# Patient Record
Sex: MALE | Race: WHITE | NOT HISPANIC OR LATINO | Employment: FULL TIME | URBAN - NONMETROPOLITAN AREA
[De-identification: names, ages, dates, MRNs, and addresses within clinical notes are randomized per-mention and may not be internally consistent; named-entity substitution may affect disease eponyms.]

---

## 2017-09-18 ENCOUNTER — TRANSCRIBE ORDERS (OUTPATIENT)
Dept: ADMINISTRATIVE | Facility: HOSPITAL | Age: 37
End: 2017-09-18

## 2017-09-18 ENCOUNTER — LAB (OUTPATIENT)
Dept: LAB | Facility: HOSPITAL | Age: 37
End: 2017-09-18

## 2017-09-18 DIAGNOSIS — R73.09 OTHER ABNORMAL GLUCOSE: ICD-10-CM

## 2017-09-18 DIAGNOSIS — K21.9 GASTROESOPHAGEAL REFLUX DISEASE WITHOUT ESOPHAGITIS: ICD-10-CM

## 2017-09-18 DIAGNOSIS — Z71.6 TOBACCO ABUSE COUNSELING: ICD-10-CM

## 2017-09-18 DIAGNOSIS — E66.8 MODERATE OBESITY: Primary | ICD-10-CM

## 2017-09-18 DIAGNOSIS — E66.8 MODERATE OBESITY: ICD-10-CM

## 2017-09-18 DIAGNOSIS — R53.82 CHRONIC FATIGUE: ICD-10-CM

## 2017-09-18 LAB
ALBUMIN SERPL-MCNC: 4.2 G/DL (ref 3.5–5)
ALBUMIN/GLOB SERPL: 1.5 G/DL (ref 1.5–2.5)
ALP SERPL-CCNC: 74 U/L (ref 40–129)
ALT SERPL W P-5'-P-CCNC: 33 U/L (ref 10–44)
ANION GAP SERPL CALCULATED.3IONS-SCNC: 4.6 MMOL/L (ref 3.6–11.2)
AST SERPL-CCNC: 15 U/L (ref 10–34)
BILIRUB SERPL-MCNC: 0.4 MG/DL (ref 0.2–1.8)
BUN BLD-MCNC: 9 MG/DL (ref 7–21)
BUN/CREAT SERPL: 12 (ref 7–25)
CALCIUM SPEC-SCNC: 9.6 MG/DL (ref 7.7–10)
CHLORIDE SERPL-SCNC: 108 MMOL/L (ref 99–112)
CHOLEST SERPL-MCNC: 160 MG/DL (ref 0–200)
CO2 SERPL-SCNC: 28.4 MMOL/L (ref 24.3–31.9)
CREAT BLD-MCNC: 0.75 MG/DL (ref 0.43–1.29)
GFR SERPL CREATININE-BSD FRML MDRD: 117 ML/MIN/1.73
GLOBULIN UR ELPH-MCNC: 2.8 GM/DL
GLUCOSE BLD-MCNC: 109 MG/DL (ref 70–110)
HBA1C MFR BLD: 6.3 % (ref 4.5–5.7)
HDLC SERPL-MCNC: 35 MG/DL (ref 60–100)
LDLC SERPL CALC-MCNC: 95 MG/DL (ref 0–100)
LDLC/HDLC SERPL: 2.7 {RATIO}
OSMOLALITY SERPL CALC.SUM OF ELEC: 280.5 MOSM/KG (ref 273–305)
POTASSIUM BLD-SCNC: 3.9 MMOL/L (ref 3.5–5.3)
PROT SERPL-MCNC: 7 G/DL (ref 6–8)
SODIUM BLD-SCNC: 141 MMOL/L (ref 135–153)
TRIGL SERPL-MCNC: 152 MG/DL (ref 0–150)
VLDLC SERPL-MCNC: 30.4 MG/DL

## 2017-09-18 PROCEDURE — 83036 HEMOGLOBIN GLYCOSYLATED A1C: CPT | Performed by: CLINIC/CENTER

## 2017-09-18 PROCEDURE — 36415 COLL VENOUS BLD VENIPUNCTURE: CPT

## 2017-09-18 PROCEDURE — 80061 LIPID PANEL: CPT | Performed by: CLINIC/CENTER

## 2017-09-18 PROCEDURE — 80053 COMPREHEN METABOLIC PANEL: CPT | Performed by: CLINIC/CENTER

## 2018-03-19 ENCOUNTER — APPOINTMENT (OUTPATIENT)
Dept: MRI IMAGING | Facility: HOSPITAL | Age: 38
End: 2018-03-19

## 2018-03-19 ENCOUNTER — APPOINTMENT (OUTPATIENT)
Dept: CARDIOLOGY | Facility: HOSPITAL | Age: 38
End: 2018-03-19

## 2018-03-19 ENCOUNTER — APPOINTMENT (OUTPATIENT)
Dept: CT IMAGING | Facility: HOSPITAL | Age: 38
End: 2018-03-19

## 2018-03-19 ENCOUNTER — APPOINTMENT (OUTPATIENT)
Dept: GENERAL RADIOLOGY | Facility: HOSPITAL | Age: 38
End: 2018-03-19

## 2018-03-19 ENCOUNTER — HOSPITAL ENCOUNTER (OUTPATIENT)
Facility: HOSPITAL | Age: 38
Setting detail: OBSERVATION
Discharge: HOME | End: 2018-03-20
Attending: INTERNAL MEDICINE | Admitting: INTERNAL MEDICINE

## 2018-03-19 ENCOUNTER — HOSPITAL ENCOUNTER (EMERGENCY)
Facility: HOSPITAL | Age: 38
Discharge: ANOTHER HOSPITAL | End: 2018-03-19
Attending: EMERGENCY MEDICINE | Admitting: EMERGENCY MEDICINE

## 2018-03-19 VITALS
DIASTOLIC BLOOD PRESSURE: 80 MMHG | SYSTOLIC BLOOD PRESSURE: 123 MMHG | HEIGHT: 69 IN | TEMPERATURE: 98 F | OXYGEN SATURATION: 97 % | BODY MASS INDEX: 46.65 KG/M2 | HEART RATE: 90 BPM | RESPIRATION RATE: 18 BRPM | WEIGHT: 315 LBS

## 2018-03-19 DIAGNOSIS — Z74.09 IMPAIRED MOBILITY AND ADLS: ICD-10-CM

## 2018-03-19 DIAGNOSIS — Z74.09 IMPAIRED FUNCTIONAL MOBILITY, BALANCE, GAIT, AND ENDURANCE: Primary | ICD-10-CM

## 2018-03-19 DIAGNOSIS — I63.59 CEREBROVASCULAR ACCIDENT (CVA) DUE TO OCCLUSION OF OTHER CEREBRAL ARTERY (HCC): Primary | ICD-10-CM

## 2018-03-19 DIAGNOSIS — F80.9 COMMUNICATION IMPAIRMENT: ICD-10-CM

## 2018-03-19 DIAGNOSIS — Z78.9 IMPAIRED MOBILITY AND ADLS: ICD-10-CM

## 2018-03-19 PROBLEM — E66.9 OBESITY: Status: ACTIVE | Noted: 2018-03-19

## 2018-03-19 PROBLEM — M19.90 ARTHRITIS: Status: ACTIVE | Noted: 2018-03-19

## 2018-03-19 PROBLEM — K21.9 GERD (GASTROESOPHAGEAL REFLUX DISEASE): Status: ACTIVE | Noted: 2018-03-19

## 2018-03-19 PROBLEM — R73.9 HYPERGLYCEMIA: Status: ACTIVE | Noted: 2018-03-19

## 2018-03-19 PROBLEM — Z72.0 TOBACCO USE: Status: ACTIVE | Noted: 2018-03-19

## 2018-03-19 PROBLEM — I63.9 CVA (CEREBRAL VASCULAR ACCIDENT) (HCC): Status: ACTIVE | Noted: 2018-03-19

## 2018-03-19 PROBLEM — E78.00 HYPERCHOLESTEROLEMIA: Status: ACTIVE | Noted: 2018-03-19

## 2018-03-19 LAB
ALBUMIN SERPL-MCNC: 4 G/DL (ref 3.2–4.8)
ALBUMIN SERPL-MCNC: 4 G/DL (ref 3.5–5)
ALBUMIN/GLOB SERPL: 1.5 G/DL (ref 1.5–2.5)
ALBUMIN/GLOB SERPL: 1.5 G/DL (ref 1.5–2.5)
ALP SERPL-CCNC: 72 U/L (ref 40–129)
ALP SERPL-CCNC: 78 U/L (ref 25–100)
ALT SERPL W P-5'-P-CCNC: 28 U/L (ref 7–40)
ALT SERPL W P-5'-P-CCNC: 31 U/L (ref 10–44)
ANION GAP SERPL CALCULATED.3IONS-SCNC: 6.6 MMOL/L (ref 3.6–11.2)
ANION GAP SERPL CALCULATED.3IONS-SCNC: 9 MMOL/L (ref 3–11)
APTT PPP: 30.7 SECONDS (ref 23.8–36.1)
ARTICHOKE IGE QN: 100 MG/DL (ref 0–130)
AST SERPL-CCNC: 11 U/L (ref 0–33)
AST SERPL-CCNC: 15 U/L (ref 10–34)
BASOPHILS # BLD AUTO: 0.04 10*3/MM3 (ref 0–0.3)
BASOPHILS NFR BLD AUTO: 0.3 % (ref 0–2)
BH CV ECHO MEAS - AO MAX PG (FULL): 1.8 MMHG
BH CV ECHO MEAS - AO MAX PG: 6.4 MMHG
BH CV ECHO MEAS - AO MEAN PG (FULL): 1.1 MMHG
BH CV ECHO MEAS - AO MEAN PG: 3.4 MMHG
BH CV ECHO MEAS - AO ROOT AREA (BSA CORRECTED): 1.3
BH CV ECHO MEAS - AO ROOT AREA: 8.8 CM^2
BH CV ECHO MEAS - AO ROOT DIAM: 3.3 CM
BH CV ECHO MEAS - AO V2 MAX: 126.7 CM/SEC
BH CV ECHO MEAS - AO V2 MEAN: 86.4 CM/SEC
BH CV ECHO MEAS - AO V2 VTI: 23.5 CM
BH CV ECHO MEAS - AVA(I,A): 3.9 CM^2
BH CV ECHO MEAS - AVA(I,D): 3.9 CM^2
BH CV ECHO MEAS - AVA(V,A): 3.9 CM^2
BH CV ECHO MEAS - AVA(V,D): 3.9 CM^2
BH CV ECHO MEAS - BSA(HAYCOCK): 2.7 M^2
BH CV ECHO MEAS - BSA(HAYCOCK): 2.7 M^2
BH CV ECHO MEAS - BSA: 2.5 M^2
BH CV ECHO MEAS - BSA: 2.5 M^2
BH CV ECHO MEAS - BZI_BMI: 47.7 KILOGRAMS/M^2
BH CV ECHO MEAS - BZI_BMI: 47.7 KILOGRAMS/M^2
BH CV ECHO MEAS - BZI_METRIC_HEIGHT: 175.3 CM
BH CV ECHO MEAS - BZI_METRIC_HEIGHT: 175.3 CM
BH CV ECHO MEAS - BZI_METRIC_WEIGHT: 146.5 KG
BH CV ECHO MEAS - BZI_METRIC_WEIGHT: 146.5 KG
BH CV ECHO MEAS - CONTRAST EF 4CH: 66 ML/M^2
BH CV ECHO MEAS - EDV(CUBED): 148 ML
BH CV ECHO MEAS - EDV(MOD-SP4): 150 ML
BH CV ECHO MEAS - EDV(TEICH): 134.7 ML
BH CV ECHO MEAS - EF(CUBED): 54.8 %
BH CV ECHO MEAS - EF(TEICH): 46.2 %
BH CV ECHO MEAS - ESV(CUBED): 66.8 ML
BH CV ECHO MEAS - ESV(MOD-SP4): 51 ML
BH CV ECHO MEAS - ESV(TEICH): 72.5 ML
BH CV ECHO MEAS - FS: 23.3 %
BH CV ECHO MEAS - IVS/LVPW: 0.92
BH CV ECHO MEAS - IVSD: 1 CM
BH CV ECHO MEAS - LA DIMENSION: 3.1 CM
BH CV ECHO MEAS - LA/AO: 0.91
BH CV ECHO MEAS - LAT PEAK E' VEL: 6.9 CM/SEC
BH CV ECHO MEAS - LV DIASTOLIC VOL/BSA (35-75): 59.2 ML/M^2
BH CV ECHO MEAS - LV MASS(C)D: 217.3 GRAMS
BH CV ECHO MEAS - LV MASS(C)DI: 85.8 GRAMS/M^2
BH CV ECHO MEAS - LV MAX PG: 4.6 MMHG
BH CV ECHO MEAS - LV MEAN PG: 2.3 MMHG
BH CV ECHO MEAS - LV SYSTOLIC VOL/BSA (12-30): 20.1 ML/M^2
BH CV ECHO MEAS - LV V1 MAX: 107.6 CM/SEC
BH CV ECHO MEAS - LV V1 MEAN: 70 CM/SEC
BH CV ECHO MEAS - LV V1 VTI: 20 CM
BH CV ECHO MEAS - LVIDD: 5.3 CM
BH CV ECHO MEAS - LVIDS: 4.1 CM
BH CV ECHO MEAS - LVLD AP4: 9.4 CM
BH CV ECHO MEAS - LVLS AP4: 7.1 CM
BH CV ECHO MEAS - LVOT AREA (M): 4.5 CM^2
BH CV ECHO MEAS - LVOT AREA: 4.6 CM^2
BH CV ECHO MEAS - LVOT DIAM: 2.4 CM
BH CV ECHO MEAS - LVPWD: 1.1 CM
BH CV ECHO MEAS - MED PEAK E' VEL: 7.68 CM/SEC
BH CV ECHO MEAS - MV A MAX VEL: 57.7 CM/SEC
BH CV ECHO MEAS - MV DEC TIME: 0.18 SEC
BH CV ECHO MEAS - MV E MAX VEL: 87 CM/SEC
BH CV ECHO MEAS - MV E/A: 1.5
BH CV ECHO MEAS - PA ACC SLOPE: 703.7 CM/SEC^2
BH CV ECHO MEAS - PA ACC TIME: 0.1 SEC
BH CV ECHO MEAS - PA PR(ACCEL): 33.8 MMHG
BH CV ECHO MEAS - RVDD: 3.3 CM
BH CV ECHO MEAS - SI(AO): 81.4 ML/M^2
BH CV ECHO MEAS - SI(CUBED): 32 ML/M^2
BH CV ECHO MEAS - SI(LVOT): 36.3 ML/M^2
BH CV ECHO MEAS - SI(MOD-SP4): 39.1 ML/M^2
BH CV ECHO MEAS - SI(TEICH): 24.6 ML/M^2
BH CV ECHO MEAS - SV(AO): 206.2 ML
BH CV ECHO MEAS - SV(CUBED): 81.1 ML
BH CV ECHO MEAS - SV(LVOT): 92 ML
BH CV ECHO MEAS - SV(MOD-SP4): 99 ML
BH CV ECHO MEAS - SV(TEICH): 62.3 ML
BH CV ECHO MEAS - TAPSE (>1.6): 2.4 CM2
BH CV VAS BP RIGHT ARM: NORMAL MMHG
BH CV XLRA - RV BASE: 4.1 CM
BH CV XLRA - RV LENGTH: 8.9 CM
BH CV XLRA - RV MID: 4.3 CM
BH CV XLRA - TDI S': 12.8 CM/SEC
BH CV XLRA MEAS LEFT CCA RATIO VEL: 137 CM/SEC
BH CV XLRA MEAS LEFT DIST CCA PSV: 123.8 CM/SEC
BH CV XLRA MEAS LEFT DIST ICA EDV: 37.1 CM/SEC
BH CV XLRA MEAS LEFT DIST ICA PSV: 79.2 CM/SEC
BH CV XLRA MEAS LEFT ICA RATIO VEL: 84.9 CM/SEC
BH CV XLRA MEAS LEFT ICA/CCA RATIO: 0.62
BH CV XLRA MEAS LEFT MID CCA EDV: 23.6 CM/SEC
BH CV XLRA MEAS LEFT MID CCA PSV: 137.5 CM/SEC
BH CV XLRA MEAS LEFT MID ICA EDV: 33.3 CM/SEC
BH CV XLRA MEAS LEFT MID ICA PSV: 85.5 CM/SEC
BH CV XLRA MEAS LEFT PROX CCA EDV: 22.6 CM/SEC
BH CV XLRA MEAS LEFT PROX CCA PSV: 134.6 CM/SEC
BH CV XLRA MEAS LEFT PROX ECA PSV: 113.1 CM/SEC
BH CV XLRA MEAS LEFT PROX ICA EDV: 27.7 CM/SEC
BH CV XLRA MEAS LEFT PROX ICA PSV: 65.4 CM/SEC
BH CV XLRA MEAS LEFT PROX SCLA PSV: 126.7 CM/SEC
BH CV XLRA MEAS LEFT VERTEBRAL A EDV: 12.7 CM/SEC
BH CV XLRA MEAS LEFT VERTEBRAL A PSV: 41.5 CM/SEC
BH CV XLRA MEAS RIGHT CCA RATIO VEL: 155 CM/SEC
BH CV XLRA MEAS RIGHT DIST CCA EDV: 25.5 CM/SEC
BH CV XLRA MEAS RIGHT DIST CCA PSV: 150.3 CM/SEC
BH CV XLRA MEAS RIGHT DIST ICA EDV: 43.4 CM/SEC
BH CV XLRA MEAS RIGHT DIST ICA PSV: 102.5 CM/SEC
BH CV XLRA MEAS RIGHT ICA RATIO VEL: 76.7 CM/SEC
BH CV XLRA MEAS RIGHT ICA/CCA RATIO: 0.49
BH CV XLRA MEAS RIGHT MID CCA EDV: 29.5 CM/SEC
BH CV XLRA MEAS RIGHT MID CCA PSV: 156.2 CM/SEC
BH CV XLRA MEAS RIGHT MID ICA EDV: 23.3 CM/SEC
BH CV XLRA MEAS RIGHT MID ICA PSV: 77.3 CM/SEC
BH CV XLRA MEAS RIGHT PROX CCA EDV: 22.6 CM/SEC
BH CV XLRA MEAS RIGHT PROX CCA PSV: 132.6 CM/SEC
BH CV XLRA MEAS RIGHT PROX ECA PSV: 135.1 CM/SEC
BH CV XLRA MEAS RIGHT PROX ICA EDV: 29.5 CM/SEC
BH CV XLRA MEAS RIGHT PROX ICA PSV: 76.1 CM/SEC
BH CV XLRA MEAS RIGHT PROX SCLA PSV: 216.1 CM/SEC
BH CV XLRA MEAS RIGHT VERTEBRAL A EDV: 12.2 CM/SEC
BH CV XLRA MEAS RIGHT VERTEBRAL A PSV: 42.4 CM/SEC
BILIRUB SERPL-MCNC: 0.3 MG/DL (ref 0.2–1.8)
BILIRUB SERPL-MCNC: 0.3 MG/DL (ref 0.3–1.2)
BILIRUB UR QL STRIP: NEGATIVE
BUN BLD-MCNC: 13 MG/DL (ref 7–21)
BUN BLD-MCNC: 13 MG/DL (ref 9–23)
BUN/CREAT SERPL: 19.1 (ref 7–25)
BUN/CREAT SERPL: 21.7 (ref 7–25)
CALCIUM SPEC-SCNC: 9 MG/DL (ref 8.7–10.4)
CALCIUM SPEC-SCNC: 9.5 MG/DL (ref 7.7–10)
CHLORIDE SERPL-SCNC: 106 MMOL/L (ref 99–109)
CHLORIDE SERPL-SCNC: 109 MMOL/L (ref 99–112)
CHOLEST SERPL-MCNC: 147 MG/DL (ref 0–200)
CK MB SERPL-CCNC: 0.98 NG/ML (ref 0–5)
CK MB SERPL-RTO: 1.2 % (ref 0–3)
CK SERPL-CCNC: 83 U/L (ref 24–204)
CK SERPL-CCNC: 84 U/L (ref 26–174)
CLARITY UR: CLEAR
CO2 SERPL-SCNC: 22 MMOL/L (ref 20–31)
CO2 SERPL-SCNC: 24.4 MMOL/L (ref 24.3–31.9)
COLOR UR: YELLOW
CREAT BLD-MCNC: 0.6 MG/DL (ref 0.6–1.3)
CREAT BLD-MCNC: 0.68 MG/DL (ref 0.43–1.29)
DEPRECATED RDW RBC AUTO: 44.1 FL (ref 37–54)
DEPRECATED RDW RBC AUTO: 45.3 FL (ref 37–54)
E/E' RATIO: 11.9
EOSINOPHIL # BLD AUTO: 0.21 10*3/MM3 (ref 0–0.7)
EOSINOPHIL NFR BLD AUTO: 1.6 % (ref 0–5)
ERYTHROCYTE [DISTWIDTH] IN BLOOD BY AUTOMATED COUNT: 13.8 % (ref 11.3–14.5)
ERYTHROCYTE [DISTWIDTH] IN BLOOD BY AUTOMATED COUNT: 13.9 % (ref 11.5–14.5)
GFR SERPL CREATININE-BSD FRML MDRD: 131 ML/MIN/1.73
GFR SERPL CREATININE-BSD FRML MDRD: >150 ML/MIN/1.73
GLOBULIN UR ELPH-MCNC: 2.6 GM/DL
GLOBULIN UR ELPH-MCNC: 2.7 GM/DL
GLUCOSE BLD-MCNC: 131 MG/DL (ref 70–110)
GLUCOSE BLD-MCNC: 145 MG/DL (ref 70–100)
GLUCOSE BLDC GLUCOMTR-MCNC: 141 MG/DL (ref 70–130)
GLUCOSE BLDC GLUCOMTR-MCNC: 150 MG/DL (ref 70–130)
GLUCOSE BLDC GLUCOMTR-MCNC: 160 MG/DL (ref 70–130)
GLUCOSE UR STRIP-MCNC: NEGATIVE MG/DL
HBA1C MFR BLD: 6.7 % (ref 4.8–5.6)
HCT VFR BLD AUTO: 45.3 % (ref 42–52)
HCT VFR BLD AUTO: 45.9 % (ref 38.9–50.9)
HDLC SERPL-MCNC: 33 MG/DL (ref 40–60)
HGB BLD-MCNC: 14.9 G/DL (ref 13.1–17.5)
HGB BLD-MCNC: 15.2 G/DL (ref 14–18)
HGB UR QL STRIP.AUTO: NEGATIVE
IMM GRANULOCYTES # BLD: 0.07 10*3/MM3 (ref 0–0.03)
IMM GRANULOCYTES NFR BLD: 0.5 % (ref 0–0.5)
INR PPP: 0.94 (ref 0.9–1.1)
KETONES UR QL STRIP: NEGATIVE
LEUKOCYTE ESTERASE UR QL STRIP.AUTO: NEGATIVE
LYMPHOCYTES # BLD AUTO: 4.04 10*3/MM3 (ref 1–3)
LYMPHOCYTES NFR BLD AUTO: 30.5 % (ref 21–51)
MAXIMAL PREDICTED HEART RATE: 182 BPM
MCH RBC QN AUTO: 29 PG (ref 27–31)
MCH RBC QN AUTO: 29.7 PG (ref 27–33)
MCHC RBC AUTO-ENTMCNC: 32.5 G/DL (ref 32–36)
MCHC RBC AUTO-ENTMCNC: 33.6 G/DL (ref 33–37)
MCV RBC AUTO: 88.5 FL (ref 80–94)
MCV RBC AUTO: 89.3 FL (ref 80–99)
MONOCYTES # BLD AUTO: 1.39 10*3/MM3 (ref 0.1–0.9)
MONOCYTES NFR BLD AUTO: 10.5 % (ref 0–10)
NEUTROPHILS # BLD AUTO: 7.48 10*3/MM3 (ref 1.4–6.5)
NEUTROPHILS NFR BLD AUTO: 56.6 % (ref 30–70)
NITRITE UR QL STRIP: NEGATIVE
OSMOLALITY SERPL CALC.SUM OF ELEC: 281.3 MOSM/KG (ref 273–305)
PH UR STRIP.AUTO: 5.5 [PH] (ref 5–8)
PLATELET # BLD AUTO: 293 10*3/MM3 (ref 130–400)
PLATELET # BLD AUTO: 297 10*3/MM3 (ref 150–450)
PMV BLD AUTO: 9.4 FL (ref 6–10)
PMV BLD AUTO: 9.8 FL (ref 6–12)
POTASSIUM BLD-SCNC: 3.5 MMOL/L (ref 3.5–5.5)
POTASSIUM BLD-SCNC: 3.6 MMOL/L (ref 3.5–5.3)
PROT SERPL-MCNC: 6.6 G/DL (ref 5.7–8.2)
PROT SERPL-MCNC: 6.7 G/DL (ref 6–8)
PROT UR QL STRIP: NEGATIVE
PROTHROMBIN TIME: 12.7 SECONDS (ref 11–15.4)
RBC # BLD AUTO: 5.12 10*6/MM3 (ref 4.7–6.1)
RBC # BLD AUTO: 5.14 10*6/MM3 (ref 4.2–5.76)
RIGHT ARM BP: NORMAL MMHG
SODIUM BLD-SCNC: 137 MMOL/L (ref 132–146)
SODIUM BLD-SCNC: 140 MMOL/L (ref 135–153)
SP GR UR STRIP: 1.02 (ref 1–1.03)
STRESS TARGET HR: 155 BPM
TRIGL SERPL-MCNC: 211 MG/DL (ref 0–150)
TROPONIN I SERPL-MCNC: <0.006 NG/ML
TSH SERPL DL<=0.05 MIU/L-ACNC: 2.33 MIU/ML (ref 0.35–5.35)
UROBILINOGEN UR QL STRIP: NORMAL
WBC NRBC COR # BLD: 13.23 10*3/MM3 (ref 4.5–12.5)
WBC NRBC COR # BLD: 14.22 10*3/MM3 (ref 3.5–10.8)

## 2018-03-19 PROCEDURE — 85610 PROTHROMBIN TIME: CPT | Performed by: EMERGENCY MEDICINE

## 2018-03-19 PROCEDURE — 82550 ASSAY OF CK (CPK): CPT | Performed by: INTERNAL MEDICINE

## 2018-03-19 PROCEDURE — G0379 DIRECT REFER HOSPITAL OBSERV: HCPCS

## 2018-03-19 PROCEDURE — 63710000001 PREDNISONE PER 1 MG: Performed by: NURSE PRACTITIONER

## 2018-03-19 PROCEDURE — 93306 TTE W/DOPPLER COMPLETE: CPT | Performed by: INTERNAL MEDICINE

## 2018-03-19 PROCEDURE — 82553 CREATINE MB FRACTION: CPT | Performed by: EMERGENCY MEDICINE

## 2018-03-19 PROCEDURE — 82962 GLUCOSE BLOOD TEST: CPT

## 2018-03-19 PROCEDURE — 99284 EMERGENCY DEPT VISIT MOD MDM: CPT

## 2018-03-19 PROCEDURE — 80053 COMPREHEN METABOLIC PANEL: CPT | Performed by: PHYSICIAN ASSISTANT

## 2018-03-19 PROCEDURE — 93010 ELECTROCARDIOGRAM REPORT: CPT | Performed by: INTERNAL MEDICINE

## 2018-03-19 PROCEDURE — 92610 EVALUATE SWALLOWING FUNCTION: CPT

## 2018-03-19 PROCEDURE — 97166 OT EVAL MOD COMPLEX 45 MIN: CPT | Performed by: OCCUPATIONAL THERAPIST

## 2018-03-19 PROCEDURE — 80061 LIPID PANEL: CPT | Performed by: PHYSICIAN ASSISTANT

## 2018-03-19 PROCEDURE — 25010000002 SULFUR HEXAFLUORIDE MICROSPH 60.7-25 MG RECONSTITUTED SUSPENSION: Performed by: INTERNAL MEDICINE

## 2018-03-19 PROCEDURE — 99243 OFF/OP CNSLTJ NEW/EST LOW 30: CPT | Performed by: PSYCHIATRY & NEUROLOGY

## 2018-03-19 PROCEDURE — 93005 ELECTROCARDIOGRAM TRACING: CPT | Performed by: INTERNAL MEDICINE

## 2018-03-19 PROCEDURE — 93306 TTE W/DOPPLER COMPLETE: CPT

## 2018-03-19 PROCEDURE — 85027 COMPLETE CBC AUTOMATED: CPT | Performed by: PHYSICIAN ASSISTANT

## 2018-03-19 PROCEDURE — 92523 SPEECH SOUND LANG COMPREHEN: CPT

## 2018-03-19 PROCEDURE — 71045 X-RAY EXAM CHEST 1 VIEW: CPT | Performed by: RADIOLOGY

## 2018-03-19 PROCEDURE — 85025 COMPLETE CBC W/AUTO DIFF WBC: CPT | Performed by: EMERGENCY MEDICINE

## 2018-03-19 PROCEDURE — 70551 MRI BRAIN STEM W/O DYE: CPT

## 2018-03-19 PROCEDURE — G0378 HOSPITAL OBSERVATION PER HR: HCPCS

## 2018-03-19 PROCEDURE — 97161 PT EVAL LOW COMPLEX 20 MIN: CPT

## 2018-03-19 PROCEDURE — 93880 EXTRACRANIAL BILAT STUDY: CPT

## 2018-03-19 PROCEDURE — 84484 ASSAY OF TROPONIN QUANT: CPT | Performed by: EMERGENCY MEDICINE

## 2018-03-19 PROCEDURE — 93880 EXTRACRANIAL BILAT STUDY: CPT | Performed by: INTERNAL MEDICINE

## 2018-03-19 PROCEDURE — 80053 COMPREHEN METABOLIC PANEL: CPT | Performed by: EMERGENCY MEDICINE

## 2018-03-19 PROCEDURE — 71045 X-RAY EXAM CHEST 1 VIEW: CPT

## 2018-03-19 PROCEDURE — 93005 ELECTROCARDIOGRAM TRACING: CPT | Performed by: EMERGENCY MEDICINE

## 2018-03-19 PROCEDURE — 83036 HEMOGLOBIN GLYCOSYLATED A1C: CPT | Performed by: PHYSICIAN ASSISTANT

## 2018-03-19 PROCEDURE — 99220 PR INITIAL OBSERVATION CARE/DAY 70 MINUTES: CPT | Performed by: INTERNAL MEDICINE

## 2018-03-19 PROCEDURE — 84443 ASSAY THYROID STIM HORMONE: CPT | Performed by: PHYSICIAN ASSISTANT

## 2018-03-19 PROCEDURE — 85730 THROMBOPLASTIN TIME PARTIAL: CPT | Performed by: EMERGENCY MEDICINE

## 2018-03-19 PROCEDURE — 70450 CT HEAD/BRAIN W/O DYE: CPT

## 2018-03-19 PROCEDURE — 84484 ASSAY OF TROPONIN QUANT: CPT | Performed by: INTERNAL MEDICINE

## 2018-03-19 PROCEDURE — 82550 ASSAY OF CK (CPK): CPT | Performed by: EMERGENCY MEDICINE

## 2018-03-19 PROCEDURE — 81003 URINALYSIS AUTO W/O SCOPE: CPT | Performed by: EMERGENCY MEDICINE

## 2018-03-19 PROCEDURE — 70450 CT HEAD/BRAIN W/O DYE: CPT | Performed by: RADIOLOGY

## 2018-03-19 RX ORDER — ASPIRIN 81 MG/1
81 TABLET, CHEWABLE ORAL DAILY
Status: DISCONTINUED | OUTPATIENT
Start: 2018-03-19 | End: 2018-03-20 | Stop reason: HOSPADM

## 2018-03-19 RX ORDER — PREDNISONE 20 MG/1
60 TABLET ORAL
Status: COMPLETED | OUTPATIENT
Start: 2018-03-19 | End: 2018-03-19

## 2018-03-19 RX ORDER — ASPIRIN 81 MG/1
81 TABLET, CHEWABLE ORAL ONCE
Status: DISCONTINUED | OUTPATIENT
Start: 2018-03-19 | End: 2018-03-19

## 2018-03-19 RX ORDER — SODIUM CHLORIDE 0.9 % (FLUSH) 0.9 %
10 SYRINGE (ML) INJECTION AS NEEDED
Status: DISCONTINUED | OUTPATIENT
Start: 2018-03-19 | End: 2018-03-19 | Stop reason: HOSPADM

## 2018-03-19 RX ORDER — ASPIRIN 325 MG
325 TABLET ORAL ONCE
Status: COMPLETED | OUTPATIENT
Start: 2018-03-19 | End: 2018-03-19

## 2018-03-19 RX ORDER — VALACYCLOVIR HYDROCHLORIDE 500 MG/1
1000 TABLET, FILM COATED ORAL EVERY 8 HOURS SCHEDULED
Status: DISCONTINUED | OUTPATIENT
Start: 2018-03-19 | End: 2018-03-20 | Stop reason: HOSPADM

## 2018-03-19 RX ORDER — OXYCODONE HYDROCHLORIDE AND ACETAMINOPHEN 5; 325 MG/1; MG/1
1 TABLET ORAL ONCE
Status: COMPLETED | OUTPATIENT
Start: 2018-03-19 | End: 2018-03-19

## 2018-03-19 RX ORDER — NICOTINE 21 MG/24HR
1 PATCH, TRANSDERMAL 24 HOURS TRANSDERMAL ONCE
Status: COMPLETED | OUTPATIENT
Start: 2018-03-19 | End: 2018-03-20

## 2018-03-19 RX ORDER — NICOTINE 21 MG/24HR
1 PATCH, TRANSDERMAL 24 HOURS TRANSDERMAL
Status: DISCONTINUED | OUTPATIENT
Start: 2018-03-20 | End: 2018-03-20 | Stop reason: HOSPADM

## 2018-03-19 RX ORDER — SODIUM CHLORIDE 0.9 % (FLUSH) 0.9 %
1-10 SYRINGE (ML) INJECTION AS NEEDED
Status: DISCONTINUED | OUTPATIENT
Start: 2018-03-19 | End: 2018-03-20 | Stop reason: HOSPADM

## 2018-03-19 RX ORDER — ATORVASTATIN CALCIUM 40 MG/1
80 TABLET, FILM COATED ORAL NIGHTLY
Status: DISCONTINUED | OUTPATIENT
Start: 2018-03-19 | End: 2018-03-20 | Stop reason: HOSPADM

## 2018-03-19 RX ORDER — CELECOXIB 200 MG/1
200 CAPSULE ORAL DAILY
COMMUNITY

## 2018-03-19 RX ORDER — PANTOPRAZOLE SODIUM 40 MG/1
40 TABLET, DELAYED RELEASE ORAL
Status: DISCONTINUED | OUTPATIENT
Start: 2018-03-19 | End: 2018-03-20 | Stop reason: HOSPADM

## 2018-03-19 RX ADMIN — VALACYCLOVIR HYDROCHLORIDE 1000 MG: 500 TABLET, FILM COATED ORAL at 20:20

## 2018-03-19 RX ADMIN — ATORVASTATIN CALCIUM 80 MG: 40 TABLET, FILM COATED ORAL at 20:20

## 2018-03-19 RX ADMIN — VALACYCLOVIR HYDROCHLORIDE 1000 MG: 500 TABLET, FILM COATED ORAL at 08:32

## 2018-03-19 RX ADMIN — PREDNISONE 60 MG: 20 TABLET ORAL at 08:32

## 2018-03-19 RX ADMIN — SULFUR HEXAFLUORIDE 5 ML: KIT at 12:44

## 2018-03-19 RX ADMIN — OXYCODONE HYDROCHLORIDE AND ACETAMINOPHEN 1 TABLET: 5; 325 TABLET ORAL at 03:35

## 2018-03-19 RX ADMIN — NICOTINE 1 PATCH: 21 PATCH, EXTENDED RELEASE TRANSDERMAL at 22:06

## 2018-03-19 RX ADMIN — ASPIRIN 81 MG 81 MG: 81 TABLET ORAL at 08:32

## 2018-03-19 RX ADMIN — VALACYCLOVIR HYDROCHLORIDE 1000 MG: 500 TABLET, FILM COATED ORAL at 14:05

## 2018-03-19 RX ADMIN — ASPIRIN 325 MG: 325 TABLET ORAL at 01:08

## 2018-03-19 RX ADMIN — PANTOPRAZOLE SODIUM 40 MG: 40 TABLET, DELAYED RELEASE ORAL at 08:30

## 2018-03-19 NOTE — PLAN OF CARE
Problem: Patient Care Overview  Goal: Plan of Care Review  Outcome: Outcome(s) achieved Date Met: 03/19/18 03/19/18 1000   Coping/Psychosocial   Plan of Care Reviewed With patient   OTHER   Outcome Summary Pt at baseline status of independence with ADLS and mobility. No issues with vision, coordination,balance or strength. Pt'sonly c/o is numbness R side of face. OT to DC d/t no skilled needs, pt in agreement with this plan.

## 2018-03-19 NOTE — CONSULTS
Patient does not meet diabetes education order criteria of educate if a1C >7.5% and his was 6.7%, therefore patient was not seen for diabetes education at this time.  Please re consult as needed.

## 2018-03-19 NOTE — NURSING NOTE
ACC REVIEW REPORT: [facility]        PATIENT NAME: Huseyin Ernandez    PATIENT ID: [id number]    BED: S317    BED TYPE: TELE    BED GIVEN TO: ROLA BEATTY RN    TIME BED GIVEN: 0307    YOB: 1980    AGE: 37    GENDER: MALE    PREVIOUS ADMIT TO [facility]:     PREVIOUS ADMISSION DATE:     PATIENT CLASS:     TODAY'S DATE: 3/19/2018    TRANSFER DATE: 3/19/18    ETA: WILL CALL EMS    TRANSFERRING FACILITY: [facility]    TRANSFERRING FACILITY PHONE # : [phone]    TRANSFERRING MD: DR PADRON    DATE/TIME REQUEST RECEIVED: 3/19/18 @ 0239    [facility] RN: NORMA SUE RN    REPORT FROM: ROLA BEATTY RN    TIME REPORT TAKEN: 0307    DIAGNOSIS: R/O CVA VS BELL PALSY    REASON FOR TRANSFER TO [facility]: HIGHER LEVEL OF CARE    TRANSPORTATION: LOCAL EMS    CLINICAL REASON FOR TRANSFER TO [facility]: PT PRESENTED TO ER WITH COMPLAINTS OF NUMBNESS AROUND LIPS, CHEEKS. PATIENT ALSO REPORTED SOME BLURRY VISION IN HIS RIGHT EYE. PER REPORT SYMPTOMS STARTED APPOX 13 HOURS PRIOR TO ARRIVAL TO ER.  HE REPORTED THAT HE COULD FEEL THE PRESSURE BUT NOT THE SENSATION WHEN TOUCHED ON LIPS AND CHEEKS. ALSO SOME ASYMMETRIC OF SMILE NOTICED.   NIH STROKE SCALE =2  COPY OF DETAILED SCALE FAXED TO [facility].    NO OTHER DEFICITS REPORTED    PATIENT IS ALERT AND ORIENTED         CLINICAL INFORMATION    WEIGHT: 145 KG    ALLERGIES: SEE LIST    ALVAREZ: NO    INFECTIOUS DISEASE: NONE REPORTED    ISOLATION: NO    LAST VITAL SIGNS:  TEMP: 98.0  PULSE: 93 NSR  B/P: 135/85  RESP: 16  97% ON ROOM AIR    LAB INFORMATION: ALL LABS WNL PER REPORT      MEDS/IV FLUIDS: # 20 RAC, SALINE LOCKED    CARDIAC SYSTEM:    CHEST PAIN:NONE REPORTED    RHYTHM: NSR  Is patient taking or has patient been given any drugs that could increase bleeding? NONE REPORTED    RESPIRATORY SYSTEM:    LUNG SOUNDS: CLEAR    CNS/MUSCULOSKELETAL    ALERT AND ORIENTED: YES      STROKE SCALE: 2 , COPY OF DETAILED SCALE FAXED TO [facility]      SYMPTOMS: (CHOOSE APPROPRIATE)      PARALYSIS: FACIAL DROOP,  NUMBNESS IN LIPS AND CHEEKS    VISION CHANGE:  BLURRED VISION IN RIGHT EYE      CAT SCAN RESULTS:CT NEGATIVE PER REPORT      GI//GY      ABDOMINAL PAIN:  NONE REPORTED      PAST MEDICAL HISTORY: HYPERLIPIDEMIA  GERD        Melba Gloria RN  3/19/2018  3:24 AM

## 2018-03-19 NOTE — ED NOTES
Dr. PARRA on the phone with Dr. Buckley at this time.      Baylee Winter, FABIOLA  03/19/18 6990

## 2018-03-19 NOTE — THERAPY EVALUATION
Acute Care - Speech Language Pathology Initial Evaluation  [facility]   Clinical Swallow Evaluation    Cognitive-Communication Evaluation     Patient Name: Huseyin Ernandez  : 1980  MRN: [id number]  Today's Date: 3/19/2018  Onset of Illness/Injury or Date of Surgery: 18     Referring Physician: YAZMIN Barreto      Admit Date: 3/19/2018     Visit Dx:    ICD-10-CM ICD-9-CM   1. Impaired functional mobility, balance, gait, and endurance Z74.09 V49.89   2. Impaired mobility and ADLs Z74.09 799.89   3. Communication impairment F80.9 307.9     Patient Active Problem List   Diagnosis   • CVA (cerebral vascular accident)   • Arthritis   • GERD (gastroesophageal reflux disease)   • Tobacco use   • Obesity   • Hypercholesterolemia   • Hyperglycemia     Past Medical History:   Diagnosis Date   • GERD (gastroesophageal reflux disease)    • Hypercholesteremia      Past Surgical History:   Procedure Laterality Date   • CYST REMOVAL            SLP EVALUATION (last 72 hours)      SLP SLC Evaluation     Row Name 18 0900                   Communication Assessment/Intervention    Document Type (P)  evaluation  -MD        Subjective Information (P)  no complaints  -MD        Patient Observations (P)  alert;cooperative;agree to therapy  -MD        Patient Effort (P)  excellent  -MD           General Information    Patient Profile Reviewed (P)  yes  -MD        Pertinent History Of Current Problem (P)  adm w/ r/o CVA; R facial droop, ?Mayer Palsy  -MD        Precautions/Limitations, Vision (P)  WFL;for purposes of eval  -MD        Precautions/Limitations, Hearing (P)  WFL;for purposes of eval  -MD        Prior Level of Function-Communication (P)  WFL  -MD        Plans/Goals Discussed with (P)  patient;agreed upon  -MD        Barriers to Rehab (P)  none identified  -MD        Patient's Goals for Discharge (P)  return to all previous roles/activities  -MD           Pain Assessment    Additional Documentation (P)  Pain Scale:  Word Pre/Post-Treatment (Group)  -MD           Pain Scale: Word Pre/Post-Treatment    Pain: Word Scale, Pretreatment (P)  0 - no pain  -MD        Pain: Word Scale, Post-Treatment (P)  0 - no pain  -MD           Comprehension Assessment/Intervention    Comprehension Assessment/Intervention (P)  Auditory Comprehension;Reading Comprehension  -MD           Auditory Comprehension Assessment/Intervention    Auditory Comprehension (Communication) (P)  WFL  -MD        Able to Identify Objects/Pictures (Communication) (P)  WFL;familiar objects;pictures of common objects;body part  -MD        Answers Questions (Communication) (P)  WFL;personal;complex;wh questions;yes/no  -MD        Able to Follow Commands (Communication) (P)  WFL;multi-step  -MD        Narrative Discourse (P)  WFL  -MD           Reading Comprehension Assessment/Intervention    Reading Comprehension (Communication) (P)  [facility]  -MD        Scanning (Reading) (P)  WFL;paragraphs  -MD        Paragraph Level (P)  WFL  -MD        Functional Reading Tasks (P)  WFL  -MD           Expression Assessment/Intervention    Expression Assessment/Intervention (P)  verbal expression  -MD           Verbal Expression Assessment/Intervention    Verbal Expression (P)  WFL  -MD        Automatic Speech (Communication) (P)  response to greeting  -MD        Responsive Naming (P)  WFL  -MD        Confrontational Naming (P)  [facility]  -MD        Spontaneous/Functional Words (P)  WFL  -MD        Conversational Discourse/Fluency (P)  L  -MD           Oral Motor Structure and Function    Oral Motor Structure and Function (P)  WFL  -MD        Dentition Assessment (P)  natural, present and adequate  -MD        Mucosal Quality (P)  moist, healthy  -MD           Oral Musculature and Cranial Nerve Assessment    Oral Motor General Assessment (P)  other (see comments)   R facial droop/weakness  -MD           Motor Speech Assessment/Intervention    Motor Speech Function (P)  mild impairment  -MD         Characteristics Consistent with Dysarthria (P)  slurred speech  -MD        Initiation of Phonation (Communication) (P)  voluntary  -MD        Automatic Speech (Communication) (P)  WFL;response to greeting  -MD        Conversational Speech (Communication) (P)  WFL  -MD        Motor Speech, Comment (P)  Mildly slurred speech 2' R facial droop  -MD           Cursory Voice Assessment/Intervention    Quality and Resonance (Voice) (P)  WFL  -MD           Cognitive Assessment Intervention- SLP    Cognitive Function (Cognition) (P)  WFL  -MD        Orientation Status (Cognition) (P)  WFL;awareness of basic personal information;person;place;time;situation  -MD        Memory (Cognitive) (P)  WFL  -MD        Attention (Cognitive) (P)  WFL  -MD        Thought Organization (Cognitive) (P)  WFL  -MD        Reasoning (Cognitive) (P)  WFL  -MD        Problem Solving (Cognitive) (P)  unable/difficult to assess  -MD        Pragmatics (Communication) (P)  WFL  -MD           SLP Clinical Impressions    SLP Diagnosis (P)  Pt presents w/ mild dysarthria c/b imprecise articulation at the conversational level 2' R facial weakness. Receptive/expressive language and cog-com skills WFL. Suspected Bell's Palsy per MD note. SLP f/u x1 to provide strategies if symptoms have not improved.   -MD        Rehab Potential/Prognosis (P)  excellent  -MD        Criteria for Skilled Therapy Interventions Met (P)  yes  -MD        Functional Impact (P)  functional impact in social situations  -MD           Recommendations    Therapy Frequency (SLP) (P)  PRN  -MD        Predicted Duration Therapy Intervention (Days) (P)  until discharge  -MD        Anticipated Dischage Disposition (P)  home  -MD           Motor Speech/Dysarthria Treatment Objectives    Articulation Selection (P)  articulation, SLP goal 1  -MD           Articulation Goal 1 (SLP)    Improve Articulation Goal 1 (SLP) (P)  by over-articulating in connected speech;90%;independently (over 90%  accuracy)  -MD        Time Frame (Articulation Goal 1, SLP) (P)  short term goal (STG);by discharge  -MD        Progress/Outcomes (Articulation Goal 1, SLP) (P)  goal ongoing  -MD           Additional Goals    Additional Goal Selection (P)  additional goal, SLP goal (free text)  -MD           Additional Goal (SLP)    Additional Goal, SLP (P)  LTG: Patient will improve communication in order to optimally participate in care and in environment.  -MD        Time Frame (Additional Goal, SLP) (P)  by discharge  -MD        Progress/Outcomes (Additional Goal, SLP) (P)  goal ongoing  -MD          User Key  (r) = Recorded By, (t) = Taken By, (c) = Cosigned By    Initials Name Effective Dates    MD Tasia Alonso, Speech Therapy Student 03/07/18 -                EDUCATION  The patient has been educated in the following areas:     Cognitive Impairment Communication Impairment.    SLP Recommendation and Plan    SLP Diagnosis: (P) Pt presents w/ mild dysarthria c/b imprecise articulation at the conversational level 2' R facial weakness. Receptive/expressive language and cog-com skills WFL. Suspected Bell's Palsy per MD note. SLP f/u x1 to provide strategies if symptoms have not improved.     Rehab Potential/Prognosis: (P) excellent    Criteria for Skilled Therapy Interventions Met: (P) yes    Anticipated Dischage Disposition: (P) home         Therapy Frequency (SLP): (P) PRN    Predicted Duration Therapy Intervention (Days): (P) until discharge          Plan of Care Review  Plan of Care Reviewed With: (P) patient  Plan of Care Reviewed With: (P) patient  Progress: (P)  (eval)              SLP GOALS     Row Name 03/19/18 0900             Articulation Goal 1 (SLP)    Improve Articulation Goal 1 (SLP) (P)  by over-articulating in connected speech;90%;independently (over 90% accuracy)  -MD      Time Frame (Articulation Goal 1, SLP) (P)  short term goal (STG);by discharge  -MD      Progress/Outcomes (Articulation Goal 1, SLP) (P)   goal ongoing  -MD         Additional Goal (SLP)    Additional Goal, SLP (P)  LTG: Patient will improve communication in order to optimally participate in care and in environment.  -MD      Time Frame (Additional Goal, SLP) (P)  by discharge  -MD      Progress/Outcomes (Additional Goal, SLP) (P)  goal ongoing  -MD        User Key  (r) = Recorded By, (t) = Taken By, (c) = Cosigned By    Initials Name Provider Type    MD Tasia Alonso Speech Therapy Student Speech Therapy Student                      Time Calculation:           Time Calculation- SLP     Row Name 18 1003             Time Calculation- SLP    SLP Start Time (P)  0900  -MD      SLP Received On (P)  18  -MD        User Key  (r) = Recorded By, (t) = Taken By, (c) = Cosigned By    Initials Name Provider Type    MD Tasia Alonso Speech Therapy Student Speech Therapy Student          Therapy Charges for Today     Code Description Service Date Service Provider Modifiers Qty    20805276663 HC ST EVAL SPEECH AND PROD W LANG  3 3/19/2018 Tasia Alonso Speech Therapy Student GN 1    [id number] HC ST EVAL ORAL PHARYNG SWALLOW 1 3/19/2018 Tasia Alonso Speech Therapy Student GN 1                     Tasia Alonso Speech Therapy Student  3/19/2018 and Acute Care - Speech Language Pathology   Swallow Initial Evaluation [facility]   Clinical Swallow Evaluation     Patient Name: Huseyin Ernandez  : 1980  MRN: [id number]  Today's Date: 3/19/2018  Onset of Illness/Injury or Date of Surgery: 18     Referring Physician: YAZMIN Barreto      Admit Date: 3/19/2018    Visit Dx:     ICD-10-CM ICD-9-CM   1. Impaired functional mobility, balance, gait, and endurance Z74.09 V49.89   2. Impaired mobility and ADLs Z74.09 799.89   3. Communication impairment F80.9 307.9     Patient Active Problem List   Diagnosis   • CVA (cerebral vascular accident)   • Arthritis   • GERD (gastroesophageal reflux disease)   • Tobacco use   • Obesity   •  Hypercholesterolemia   • Hyperglycemia     Past Medical History:   Diagnosis Date   • GERD (gastroesophageal reflux disease)    • Hypercholesteremia      Past Surgical History:   Procedure Laterality Date   • CYST REMOVAL            SWALLOW EVALUATION (last 72 hours)      SLP Adult Swallow Evaluation     Row Name 03/19/18 0900                   Oral Motor and Function    Secretion Management (P)  WNL/WFL  -MD        Volitional Swallow (P)  WFL  -MD        Volitional Cough (P)  WFL  -MD           Oral Musculature and Cranial Nerve Assessment    Oral Labial or Buccal Impairment, Detail, Cranial Nerve VII (Facial): (P)  right labial droop  -MD           General Eating/Swallowing Observations    Eating/Swallowing Skills (P)  self-fed;appropriate self-feeding skills observed  -MD        Positioning During Eating (P)  upright in chair  -MD        Utensils Used (P)  cup  -MD        Consistencies Trialed (P)  thin liquids  -MD           Clinical Swallow Eval    Oral Prep Phase (P)  WFL  -MD        Oral Transit (P)  WFL  -MD        Oral Residue (P)  WFL  -MD        Pharyngeal Phase (P)  no overt signs/symptoms of pharyngeal impairment  -MD        Esophageal Phase (P)  unremarkable  -MD        Clinical Swallow Evaluation Summary (P)  Observed pt w/ thin liquids from meal tray. Baseline coughing observed prior to PO trials. No overt s/s observed w/ trial of thins. Pt reported no difficulty w/ swallowing at this time. Rec: con't regular and thins, reconsult SLP if any further dysphagia concerns arise.   -MD           Recommendations    SLP Diet Recommendation (P)  regular textures;thin liquids  -MD        Recommended Precautions and Strategies (P)  upright posture during/after eating;small bites of food and sips of liquid  -MD        SLP Rec. for Method of Medication Administration (P)  meds whole;with thin liquids  -MD        Monitor for Signs of Aspiration (P)  notify SLP if any concerns;cough;gurgly voice;throat  clearing;pneumonia  -MD          User Key  (r) = Recorded By, (t) = Taken By, (c) = Cosigned By    Initials Name Effective Dates    MD Tasia Alonso, Speech Therapy Student 03/07/18 -         EDUCATION  The patient has been educated in the following areas:   Dysphagia (Swallowing Impairment).    SLP Recommendation and Plan     SLP Diet Recommendation: (P) regular textures, thin liquids  Recommended Precautions and Strategies: (P) upright posture during/after eating, small bites of food and sips of liquid     Monitor for Signs of Aspiration: (P) notify SLP if any concerns, cough, gurgly voice, throat clearing, pneumonia        Anticipated Dischage Disposition: (P) home     Therapy Frequency (SLP): (P) PRN  Predicted Duration Therapy Intervention (Days): (P) until discharge       Plan of Care Reviewed With: (P) patient  Plan of Care Review  Plan of Care Reviewed With: (P) patient  Progress: (P)  (eval)          SLP GOALS     Row Name 03/19/18 0900             Articulation Goal 1 (SLP)    Improve Articulation Goal 1 (SLP) (P)  by over-articulating in connected speech;90%;independently (over 90% accuracy)  -MD      Time Frame (Articulation Goal 1, SLP) (P)  short term goal (STG);by discharge  -MD      Progress/Outcomes (Articulation Goal 1, SLP) (P)  goal ongoing  -MD         Additional Goal (SLP)    Additional Goal, SLP (P)  LTG: Patient will improve communication in order to optimally participate in care and in environment.  -MD      Time Frame (Additional Goal, SLP) (P)  by discharge  -MD      Progress/Outcomes (Additional Goal, SLP) (P)  goal ongoing  -MD        User Key  (r) = Recorded By, (t) = Taken By, (c) = Cosigned By    Initials Name Provider Type    MD Tasia Alonso, Speech Therapy Student Speech Therapy Student               Time Calculation:         Time Calculation- SLP     Row Name 03/19/18 1003             Time Calculation- SLP    SLP Start Time (P)  0900  -MD      SLP Received On (P)  03/19/18   -MD        User Key  (r) = Recorded By, (t) = Taken By, (c) = Cosigned By    Initials Name Provider Type    MD Tasia Alonso, Speech Therapy Student Speech Therapy Student          Therapy Charges for Today     Code Description Service Date Service Provider Modifiers Qty    [id number]  ST EVAL SPEECH AND PROD W LANG  3 3/19/2018 Tasia Alonso, Speech Therapy Student GN 1    [id number]  ST EVAL ORAL PHARYNG SWALLOW 1 3/19/2018 Tasia Alonso, Speech Therapy Student GN 1               Tasia Alonso, Speech Therapy Student  3/19/2018

## 2018-03-19 NOTE — THERAPY DISCHARGE NOTE
Acute Care - Physical Therapy Initial Eval/Discharge  [facility]     Patient Name: Huseyin Ernandez  : 1980  MRN: [id number]  Today's Date: 3/19/2018   Onset of Illness/Injury or Date of Surgery: 18  Date of Referral to PT: 18  Referring Physician: YAZMIN Barreto      Admit Date: 3/19/2018    Visit Dx:    ICD-10-CM ICD-9-CM   1. Impaired functional mobility, balance, gait, and endurance Z74.09 V49.89   2. Impaired mobility and ADLs Z74.09 799.89   3. Communication impairment F80.9 307.9     Patient Active Problem List   Diagnosis   • CVA (cerebral vascular accident)   • Arthritis   • GERD (gastroesophageal reflux disease)   • Tobacco use   • Obesity   • Hypercholesterolemia   • Hyperglycemia     Past Medical History:   Diagnosis Date   • GERD (gastroesophageal reflux disease)    • Hypercholesteremia      Past Surgical History:   Procedure Laterality Date   • CYST REMOVAL            PT ASSESSMENT (last 72 hours)      Physical Therapy Evaluation     Row Name 18 0820          PT Evaluation Time/Intention    Subjective Information no complaints  -KM     Document Type evaluation;discharge evaluation/summary  -KM     Mode of Treatment physical therapy  -KM     Patient Effort excellent  -KM     Symptoms Noted During/After Treatment --   R facial droop  -KM     Row Name 18 0820          General Information    Patient Profile Reviewed? yes  -KM     Onset of Illness/Injury or Date of Surgery 18  -KM     Referring Physician Estevan MCDONALD  -KM     Patient Observations alert;cooperative;agree to therapy  -KM     Prior Level of Function independent:;gait;transfer;ADL's   works as EMT  -KM     Equipment Currently Used at Home none  -KM     Pertinent History of Current Functional Problem Pt was working his shift as EMT and noticed R facial numbness and weakness. CT negative at OSH, transferred to [facility] for stroke work up and higher level of care. Neurology consult pending to r/o CVA, Bell's Palsy  being  considered in differential diagnosis  -KM     Risks Reviewed patient:;LOB  -KM     Benefits Reviewed patient:;improve function  -KM     Barriers to Rehab none identified  -KM     Row Name 03/19/18 0820          Relationship/Environment    Primary Source of Support/Comfort spouse;extended family  -KM     Lives With spouse  -KM     Family Caregiver if Needed --  -KM     Row Name 03/19/18 0820          Resource/Environmental Concerns    Current Living Arrangements home/apartment/condo  -KM     Row Name 03/19/18 0820          Cognitive Assessment/Intervention- PT/OT    Affect/Mental Status (Cognitive) WNL  -KM     Orientation Status (Cognition) oriented x 4  -KM     Follows Commands (Cognition) WNL  -KM     Row Name 03/19/18 0820          Bed Mobility Assessment/Treatment    Bed Mobility Assessment/Treatment bed mobility (all) activities  -KM     Jay Level (Bed Mobility) independent  -KM     Row Name 03/19/18 0820          Transfer Assessment/Treatment    Transfer Assessment/Treatment sit-stand transfer;stand-sit transfer  -KM     Sit-Stand Jay (Transfers) independent  -KM     Stand-Sit Jay (Transfers) independent  -KM     Row Name 03/19/18 0820          Gait/Stairs Assessment/Training    Gait/Stairs Assessment/Training gait/ambulation independence  -KM     Jay Level (Gait) independent  -KM     Distance in Feet (Gait) 250  -KM     Pattern (Gait) step-through  -KM     Row Name 03/19/18 0820          General ROM    GENERAL ROM COMMENTS --   B L/E wfls  -KM     Row Name 03/19/18 0820          General Assessment (Manual Muscle Testing)    General Manual Muscle Testing (MMT) Assessment no strength deficits identified  -KM     Comment, General Manual Muscle Testing (MMT) Assessment  B L/E 5/5  -KM     Row Name 03/19/18 0820          Gross Motor Coordination    Gross Motor Skill, Impairments Detail intact heel to shin B l/e  -KM     Row Name 03/19/18 0820          Balance    Balance static  sitting balance;dynamic standing balance  -     Row Name 03/19/18 0820          Static Sitting Balance    Level of [place] (Unsupported Sitting, Static Balance) independent  -     Row Name 03/19/18 0820          Dynamic Standing Balance    Level of [place], Reaches Outside Midline (Standing, Dynamic Balance) independent   retrieved object from floor  -     Row Name 03/19/18 0820          Sensory Assessment/Intervention    Sensory General Assessment no sensation deficits identified;other (see comments)   mild r facial numbness  -     Row Name 03/19/18 0820          Pain Assessment    Additional Documentation Pain Scale: Numbers Pre/Post-Treatment (Group)  -[facility] Name 03/19/18 0820          Pain Scale: Numbers Pre/Post-Treatment    Pain Scale: Numbers, Pretreatment 0/10 - no pain  -     Pain Scale: Numbers, Post-Treatment 0/10 - no pain  -[facility] Name 03/19/18 0820          Physical Therapy Clinical Impression    Date of Referral to PT 03/19/18  -     PT Diagnosis (PT Clinical Impression) impaired mobility  -KM     Patient/Family Goals Statement (PT Clinical Impression) return home  -KM     Criteria for Skilled Interventions Met (PT Clinical Impression) no problems identified which require skilled intervention;current level of function same as previous level of function  -KM     Care Plan Review (PT) evaluation/treatment results reviewed;care plan/treatment goals reviewed;patient/other agree to care plan  -     Row Name 03/19/18 0820          Positioning and Restraints    Pre-Treatment Position in bed  -KM     Post Treatment Position chair  -KM     In Chair sitting;call light within reach;encouraged to call for assist  -[facility] Name 03/19/18 0820          Physical Therapy Discharge Summary    Additional Documentation --   Pt w/o deficits, independent functional mobility  -       User Key  (r) = Recorded By, (t) = Taken By, (c) = Cosigned By    Initials Name Provider Type      Jenna Alberts, PT Physical Therapist          Physical Therapy Education     Title: PT OT SLP Therapies (Done)     Topic: Physical Therapy (Done)     Point: Mobility training (Done)    Learning Progress Summary     Learner Status Readiness Method Response Comment Documented by    Patient Done TINO Lang Discussed plan of care and pt in agreement.  03/19/18 1011          Point: Precautions (Done)    Learning Progress Summary     Learner Status Readiness Method Response Comment Documented by    Patient Done TINO Lang Discussed plan of care and pt in agreement.  03/19/18 1011                      User Key     Initials Effective Dates Name Provider Type Discipline     06/19/15 -  Jenna Alberts PT Physical Therapist PT                PT Recommendation and Plan  Anticipated Discharge Disposition (PT): home or self care  Outcome Summary/Treatment Plan (PT)  Anticipated Discharge Disposition (PT): home or self care  Plan of Care Reviewed With: patient  Outcome Summary: Pt with baseline status of independent functional mobility, no deficits noted requiring skilled svcs. Will d/c PT , anticipate return home. May benefit from outpt PT if Bell's Palsy diagnosed. Pt agrees to care plan          Outcome Measures     Row Name 03/19/18 0925 03/19/18 0820          How much help from another person do you currently need...    Turning from your back to your side while in flat bed without using bedrails?  -- 4  -KM     Moving from lying on back to sitting on the side of a flat bed without bedrails?  -- 4  -KM     Moving to and from a bed to a chair (including a wheelchair)?  -- 4  -KM     Standing up from a chair using your arms (e.g., wheelchair, bedside chair)?  -- 4  -KM     Climbing 3-5 steps with a railing?  -- 4  -KM     To walk in hospital room?  -- 4  -KM     AM-PAC 6 Clicks Score  -- 24  -KM        How much help from another is currently needed...    Putting on and taking off regular lower body  clothing? 4  -AR  --     Bathing (including washing, rinsing, and drying) 4  -AR  --     Toileting (which includes using toilet bed pan or urinal) 4  -AR  --     Putting on and taking off regular upper body clothing 4  -AR  --     Taking care of personal grooming (such as brushing teeth) 4  -AR  --     Eating meals 4  -AR  --     Score 24  -AR  --        Modified [place] Scale    Modified [place] Scale 1 - No significant disability despite symptoms.  Able to carry out all usual duties and activities.  -AR 1 - No significant disability despite symptoms.  Able to carry out all usual duties and activities.  -KM        Functional Assessment    Outcome Measure Options AM-PAC 6 Clicks Daily Activity (OT);Modified Jennifer  -AR AM-PAC 6 Clicks Basic Mobility (PT)  -KM       User Key  (r) = Recorded By, (t) = Taken By, (c) = Cosigned By    Initials Name Provider Type    MARU Alberts, PT Physical Therapist    AR Lucy Ennis, OT Occupational Therapist           Time Calculation:         PT Charges     Row Name 03/19/18 1008             Time Calculation    Start Time 0820  -KM      PT Received On 03/19/18  -KM        User Key  (r) = Recorded By, (t) = Taken By, (c) = Cosigned By    Initials Name Provider Type    MARU Alberts, PT Physical Therapist          Therapy Charges for Today     Code Description Service Date Service Provider Modifiers Qty    [id number]  PT EVAL LOW COMPLEXITY 4 3/19/2018 Jenna Alberts, PT GP 1          PT G-Codes  Outcome Measure Options: AM-PAC 6 Clicks Daily Activity (OT), Modified Jennifer    PT Discharge Summary  Anticipated Discharge Disposition (PT): home or self care  Reason for Discharge: Independent, At baseline function  Discharge Destination: Home    Jenna Alberts, PT  3/19/2018

## 2018-03-19 NOTE — CONSULTS
Neurology    Referring provider:   Romain Koehler MD  [address]  [place] [address]    Reason for Consultation: Right facial weakness    Chief complaint: Right facial weakness    History of present illness:  This 37-year-old man has a one-day history of gradual onset of right facial droop affecting eye closure for head muscles and mouth.  He had some taste prefers.  He has no hearing change.  He has no pain associated with it.    He has no facial numbness or coordination issues and ataxia or vertigo.    No viral exposures.        Review of Systems: Patient has mild chest pain.    This is been off and on and is being evaluated by Dr. Crenshaw.    All other systems reviewed and are negative.        Home meds:   Prescriptions Prior to Admission   Medication Sig Dispense Refill Last Dose   • celecoxib (CeleBREX) 200 MG capsule Take 200 mg by mouth Daily.   3/17/2018   • aspirin 81 MG chewable tablet Chew 81 mg daily.      • baclofen (LIORESAL) 10 MG tablet Take 10 mg by mouth daily.   3/17/2018   • EPINEPHrine (EPIPEN 2-AMANDA) 0.3 MG/0.3ML solution auto-injector injection Inject 0.3 mL into the shoulder, thigh, or buttocks 1 (one) time for 1 dose. 1 each 0    • omeprazole (PriLOSEC) 20 MG capsule Take 20 mg by mouth daily.   3/18/2018       History  Past Medical History:   Diagnosis Date   • GERD (gastroesophageal reflux disease)    • Hypercholesteremia    ,   Past Surgical History:   Procedure Laterality Date   • CYST REMOVAL     ,   Family History   Problem Relation Age of Onset   • Arthritis Mother    • Diabetes Father    • Heart disease Father    • Hypertension Father    • Arthritis Father    • No Known Problems Brother    ,   Social History   Substance Use Topics   • Smoking status: Current Every Day Smoker     Packs/day: 2.00     Types: Cigarettes   • Smokeless tobacco: Current User     Types: Chew   • Alcohol use No    and Allergies:  Bee venom; Latex; Morphine and related; Pneumococcal  "vaccines; and Ppd [tuberculin purified protein derivative],    Vital Signs   Blood pressure 142/75, pulse 88, temperature 97.7 °F (36.5 °C), temperature source Oral, resp. rate 18, height 175.3 cm (69\"), weight (!) 147 kg (323 lb), SpO2 96 %.  Body mass index is 47.7 kg/m².    Physical Exam:   General: Morbidly obese              Neck: No bruits                Head: Normocephalic              Resp:  normal breath sounds              Cor: Regular rhythm              Extr: No edema              Skin: Warm and dry               Neuro: Alert and oriented normal memory attention and concentration.    Speech is articulate.    Coordination is normal finger to nose and fine finger movements.    Cranial nerves show benign fundi his tympanic membranes are clear.  Eye movements full.    Corneal reflexes are diminished on the right-hand side.    Patient's sensation is normal to pinprick bilaterally.    He has a peripheral facial palsy on the right side but it can covers the cornea when he is close diet relaxed.    Palate elevates normally tongue protrudes normally.    Mouth shows a vesicular lesion on the roof of his mouth.    Motor testing shows symmetrical strength and tone in all muscle groups.    Sensory testing is normal.    Gait is normal.    Absent Romberg.    Results Review: MRI of the brain is personally reviewed and is normal.    Labs:  Lab Results (last 72 hours)     Procedure Component Value Units Date/Time    Troponin [[id number]]  (Normal) Collected:  03/19/18 1345    Specimen:  Blood Updated:  03/19/18 1442     Troponin I <0.006 ng/mL      Comment: Results may be falsely decreased if patient taking Biotin.       CK [[id number]]  (Normal) Collected:  03/19/18 1013    Specimen:  Blood Updated:  03/19/18 1115     Creatine Kinase 84 U/L     Troponin [[id number]]  (Normal) Collected:  03/19/18 1013    Specimen:  Blood Updated:  03/19/18 1115     Troponin I <0.006 ng/mL      Comment: Results may be falsely decreased if " patient taking Biotin.       POC Glucose Once [[id number]]  (Abnormal) Collected:  03/19/18 1006    Specimen:  Blood Updated:  03/19/18 1014     Glucose 141 (H) mg/dL     Narrative:       Meter: [id number] : [id number] Stephanie Antonio    Hemoglobin A1c [[id number]]  (Abnormal) Collected:  03/19/18 0612    Specimen:  Blood Updated:  03/19/18 0812     Hemoglobin A1C 6.70 (H) %     Narrative:       The American Diabetes Association recommends maintenance of Hemoglobin A1C at 7.0% or lower. Goals for Hemoglobin A1C reduction may need to be modified if hypoglycemia is a problem.    Lipid Panel [[id number]]  (Abnormal) Collected:  03/19/18 0612    Specimen:  Blood Updated:  03/19/18 0713     Total Cholesterol 147 mg/dL      Triglycerides 211 (H) mg/dL      HDL Cholesterol 33 (L) mg/dL      LDL Cholesterol  100 mg/dL     Narrative:       Cholesterol Reference Ranges:   Desirable       < 200 mg/dL   Borderline    200-239 mg/dL   High Risk       > 239 mg/dL    Triglyceride Reference Ranges:   Normal          < 150 mg/dL   Borderline    150-199 mg/dL   High          200-499 mg/dL   Very High       > 499 mg/dL    HDL Reference Ranges:   Low              < 40 mg/dL   High             > 59 mg/dL    LDL Reference Ranges:   Optimal         < 100 mg/dL   Near Optimal  100-129 mg/dL   Borderline    130-159 mg/dL   High          160-189 mg/dL   Very High       > 189 mg/dL    Comprehensive Metabolic Panel [[id number]]  (Abnormal) Collected:  03/19/18 0612    Specimen:  Blood Updated:  03/19/18 0713     Glucose 145 (H) mg/dL      BUN 13 mg/dL      Creatinine 0.60 mg/dL      Sodium 137 mmol/L      Potassium 3.5 mmol/L      Chloride 106 mmol/L      CO2 22.0 mmol/L      Calcium 9.0 mg/dL      Total Protein 6.6 g/dL      Albumin 4.00 g/dL      ALT (SGPT) 28 U/L      AST (SGOT) 11 U/L      Alkaline Phosphatase 78 U/L      Total Bilirubin 0.3 mg/dL      eGFR Non African Amer >150 mL/min/1.73      Globulin 2.6 gm/dL      A/G Ratio 1.5 g/dL       BUN/Creatinine Ratio 21.7     Anion Gap 9.0 mmol/L     Narrative:       National Kid[employer] Guidelines    Stage     Description        GFR  1         Normal or High     90+  2         Mild decrease      60-89  3         Moderate decrease  30-59  4         Severe decrease    15-29  5         Kidney failure     <15    TSH [[id number]]  (Normal) Collected:  03/19/18 0612    Specimen:  Blood Updated:  03/19/18 0713     TSH 2.334 mIU/mL     CBC (No Diff) [[id number]]  (Abnormal) Collected:  03/19/18 0612    Specimen:  Blood Updated:  03/19/18 0637     WBC 14.22 (H) 10*3/mm3      RBC 5.14 10*6/mm3      Hemoglobin 14.9 g/dL      Hematocrit 45.9 %      MCV 89.3 fL      MCH 29.0 pg      MCHC 32.5 g/dL      RDW 13.8 %      RDW-SD 45.3 fl      MPV 9.8 fL      Platelets 297 10*3/mm3     POC Glucose Once [[id number]]  (Abnormal) Collected:  03/19/18 0542    Specimen:  Blood Updated:  03/19/18 0545     Glucose 150 (H) mg/dL     Narrative:       Meter: [id number] : [id number] Junior Guillory          Rads:  Imaging Results (last 72 hours)     ** No results found for the last 72 hours. **            Assessment: Bell palsy       Plan:    Prednisone 60 mg daily for 7 days.    Valtrex thousand milligrams twice a day for 3 days.        Comment:   Good prognosis with early detection    I discussed the patients findings and my recommendations with patient, nursing staff and primary care team      Alfredito Duke MD  03/19/18  2:42 PM

## 2018-03-19 NOTE — ED NOTES
Paged Dr. Newton [facility] Stroke team. On the phone with Dr. Gill at this time.      Baylee Winter RN  03/19/18 [id number]

## 2018-03-19 NOTE — THERAPY DISCHARGE NOTE
Acute Care - Occupational Therapy Initial Eval/Discharge  [facility]     Patient Name: Huseyin Ernandez  : 1980  MRN: [id number]  Today's Date: 3/19/2018     Date of Referral to OT: 18         Admit Date: 3/19/2018       ICD-10-CM ICD-9-CM   1. Impaired functional mobility, balance, gait, and endurance Z74.09 V49.89   2. Impaired mobility and ADLs Z74.09 799.89   3. Communication impairment F80.9 307.9     Patient Active Problem List   Diagnosis   • CVA (cerebral vascular accident)   • Arthritis   • GERD (gastroesophageal reflux disease)   • Tobacco use   • Obesity   • Hypercholesterolemia   • Hyperglycemia     Past Medical History:   Diagnosis Date   • GERD (gastroesophageal reflux disease)    • Hypercholesteremia      Past Surgical History:   Procedure Laterality Date   • CYST REMOVAL            OT ASSESSMENT FLOWSHEET (last 72 hours)      Occupational Therapy Evaluation     Row Name 18 0925                   OT Evaluation Time/Intention    Subjective Information no complaints  -AR        Document Type evaluation;discharge evaluation/summary  -AR        Mode of Treatment individual therapy  -AR        Patient Effort excellent  -AR        Symptoms Noted During/After Treatment none  -AR           General Information    Patient Profile Reviewed? yes  -AR        Onset of Illness/Injury or Date of Surgery 18  -AR        Referring Physician YAZMIN Barreto  -AR        Patient Observations alert;cooperative;agree to therapy  -AR        General Observations of Patient pt up in chair  -AR        Prior Level of Function independent:;all household mobility;community mobility;gait;transfer;ADL's;cooking;home management;cleaning;driving;work   works at [employer]  -AR        Equipment Currently Used at Home none  -AR        Pertinent History of Current Functional Problem Pt is a 37 yom transferred [facility] with c/o right facial droop and headache. CT (-). Pt transferred [facility].   -AR        Existing  Precautions/Restrictions no known precautions/restrictions  -AR        Risks Reviewed patient:;LOB;nausea/vomiting;dizziness;increased discomfort;change in vital signs;lines disloged  -AR        Benefits Reviewed patient:;increase knowledge;decrease risk of DVT  -AR        Barriers to Rehab none identified  -AR           Relationship/Environment    Primary Source of Support/Comfort extended family  -AR        Lives With alone  -AR           Resource/Environmental Concerns    Current Living Arrangements home/apartment/condo  -AR        Resource/Environmental Concerns home accessibility  -AR        Home Accessibility Concerns stairs to enter home;bathroom not accessible  -AR        Transportation Concerns car, none  -AR           Home Main Entrance    Number of Stairs, Main Entrance one  -AR        Stair Railings, Main Entrance none  -AR           Cognitive Assessment/Interventions    Additional Documentation Cognitive Assessment/Intervention (Group)  -AR           Cognitive Assessment/Intervention- PT/OT    Affect/Mental Status (Cognitive) WNL  -AR        Orientation Status (Cognition) oriented x 4  -AR        Follows Commands (Cognition) WNL  -AR        Personal Safety Interventions supervised activity;nonskid shoes/slippers when out of bed;gait belt  -AR           Bed Mobility Assessment/Treatment    Comment (Bed Mobility) Not observed, pt UIC  -AR           Functional Mobility    Functional Mobility- Ind. Level independent  -AR        Functional Mobility-Distance (Feet) 20  -AR           Transfer Assessment/Treatment    Transfer Assessment/Treatment sit-stand transfer;stand-sit transfer  -AR        Sit-Stand Stamping [place] (Transfers) independent  -AR        Stand-Sit St[place] (Transfers) independent  -AR           ADL Assessment/Intervention    BADL Assessment/Intervention lower body dressing  -AR           Lower Body Dressing Assessment/Training    Lower Body Dressing Stamping Ground Level don;socks;independent   -AR           General ROM    GENERAL ROM COMMENTS BUE AROM WNL  -AR           General Assessment (Manual Muscle Testing)    Comment, General Manual Muscle Testing (MMT) Assessment BUE MT 5/5  -AR           Motor Assessment/Interventions    Additional Documentation Balance (Group);Fine Motor Testing & Training (Group);Gross Motor Coordination (Group)  -AR           Gross Motor Coordination    Gross Motor Skill, Impairments Detail intact BUE evidenced finger-to-nose  -AR           Balance    Balance dynamic sitting balance;dynamic standing balance  -AR           Dynamic Sitting Balance    Level of Stone, Reaches Outside Midline (Sitting, Dynamic Balance) independent  -AR        Sitting Position, Reaches Outside Midline (Sitting, Dynamic Balance) sitting in chair  -AR           Dynamic Standing Balance    Level of Stone, Reaches Outside Midline (Standing, Dynamic Balance) independent  -AR           Fine Motor Testing & Training    Fine Motor Tests other (see comments)   opposition  -AR        Comment, Fine Motor Coordination intact bilaterally  -AR           Sensory Assessment/Intervention    Sensory General Assessment no sensation deficits identified   BUE, mild facial numbness right side of face  -AR        Additional Documentation Vision Assessment/Intervention (Group)  -AR           Vision Assessment/Intervention    Visual Impairment/Limitations WNL;other (see comments)  -AR        Visual Treatment Interventions other (see comments)   periphereal, tracking intact  -AR           Positioning and Restraints    Pre-Treatment Position sitting in chair/recliner  -AR        Post Treatment Position chair  -AR           Pain Assessment    Additional Documentation Pain Scale: Word Pre/Post-Treatment (Group)  -AR           Pain Scale: Word Pre/Post-Treatment    Pain: Word Scale, Pretreatment 0 - no pain  -AR        Pain: Word Scale, Post-Treatment 0 - no pain  -AR           Plan of Care Review    Plan of Care  Reviewed With patient  -AR           Clinical Impression (OT)    Date of Referral to OT 03/19/18  -AR        OT Diagnosis decreased independence with ADLS  -AR        Patient/Family Goals Statement (OT Eval) return home  -AR        Criteria for Skilled Therapeutic Interventions Met (OT Eval) no problems identified which require skilled intervention  -AR        Rehab Potential (OT Eval) good, to achieve stated therapy goals  -AR        Therapy Frequency (OT Eval) evaluation only  -AR        Anticipated Discharge Disposition (OT) home or self care  -AR           Vital Signs    Pre Systolic BP Rehab 114  -AR        Pre Treatment Diastolic BP 75  -AR        Post Systolic BP Rehab 133  -AR        Post Treatment Diastolic BP 89  -AR        Pretreatment Heart Rate (beats/min) 91  -AR        Posttreatment Heart Rate (beats/min) 98  -AR        O2 Delivery Pre Treatment room air  -AR        Pre Patient Position Sitting  -AR        Intra Patient Position Standing  -AR        Post Patient Position Sitting  -AR           Discharge Summary (Occupational Therapy)    Additional Documentation Discharge Summary, OT Eval (Group)  -AR           Discharge Summary, OT Eval    Outcomes Achieved Upon Discharge (OT Discharge Summary) other (see comments)   no goals established d/t pt independent  -AR           Living Environment    Home Accessibility stairs to enter home  -AR          User Key  (r) = Recorded By, (t) = Taken By, (c) = Cosigned By    Initials Name Effective Dates    AR Lucy Ennis, OT 06/22/15 -               OT Recommendation and Plan                     Outcome Measures     Row Name 03/19/18 0918             How much help from another is currently needed...    Putting on and taking off regular lower body clothing? 4  -AR      Bathing (including washing, rinsing, and drying) 4  -AR      Toileting (which includes using toilet bed pan or urinal) 4  -AR      Putting on and taking off regular upper body clothing 4  -AR       Taking care of personal grooming (such as brushing teeth) 4  -AR      Eating meals 4  -AR      Score 24  -AR         Modified [place] Scale    Modified [place] Scale 1 - No significant disability despite symptoms.  Able to carry out all usual duties and activities.  -AR         Functional Assessment    Outcome Measure Options AM-PAC 6 Clicks Daily Activity (OT);Modified [place]  -AR        User Key  (r) = Recorded By, (t) = Taken By, (c) = Cosigned By    Initials Name Provider Type    AR Lucy Ennis OT Occupational Therapist          Time Calculation:         Time Calculation- OT     Row Name 03/19/18 1003             Time Calculation- OT    OT Start Time 0925  -AR      Total Timed Code Minutes- OT 0 minute(s)  -AR      OT Received On 03/19/18  -AR      OT Goal Re-Cert Due Date 03/19/18  -AR        User Key  (r) = Recorded By, (t) = Taken By, (c) = Cosigned By    Initials Name Provider Type    HARRY Ennis OT Occupational Therapist          Therapy Charges for Today     Code Description Service Date Service Provider Modifiers Qty    [id number]  OT EVAL MOD COMPLEXITY 3 3/19/2018 Lucy Ennis OT GO 1               OT Discharge Summary  Anticipated Discharge Disposition (OT): home or self care  Reason for Discharge: At baseline function, Independent  Outcomes Achieved: Other (no goals established)  Discharge Destination: Home    Lucy Ennis OT  3/19/2018

## 2018-03-19 NOTE — THERAPY EVALUATION
Acute Care - Speech Language Pathology Initial Evaluation  [facility]   Cognitive-Communication Evaluation     Patient Name: Huseyin Ernandez  : 1980  MRN: [id number]  Today's Date: 3/19/2018  Onset of Illness/Injury or Date of Surgery: 18     Referring Physician: YAZMIN Barreto      Admit Date: 3/19/2018     Visit Dx:    ICD-10-CM ICD-9-CM   1. Impaired functional mobility, balance, gait, and endurance Z74.09 V49.89   2. Impaired mobility and ADLs Z74.09 799.89   3. Communication impairment F80.9 307.9     Patient Active Problem List   Diagnosis   • CVA (cerebral vascular accident)   • Arthritis   • GERD (gastroesophageal reflux disease)   • Tobacco use   • Obesity   • Hypercholesterolemia   • Hyperglycemia     Past Medical History:   Diagnosis Date   • GERD (gastroesophageal reflux disease)    • Hypercholesteremia      Past Surgical History:   Procedure Laterality Date   • CYST REMOVAL            SLP EVALUATION (last 72 hours)      SLP SLC Evaluation     Row Name 18 0900                   Communication Assessment/Intervention    Document Type (P)  evaluation  -MD        Subjective Information (P)  no complaints  -MD        Patient Observations (P)  alert;cooperative;agree to therapy  -MD        Patient Effort (P)  excellent  -MD           General Information    Patient Profile Reviewed (P)  yes  -MD        Pertinent History Of Current Problem (P)  adm w/ r/o CVA; R facial droop, ?[place] Palsy  -MD        Precautions/Limitations, Vision (P)  WFL;for purposes of eval  -MD        Precautions/Limitations, Hearing (P)  WFL;for purposes of eval  -MD        Prior Level of Function-Communication (P)  WFL  -MD        Plans/Goals Discussed with (P)  patient;agreed upon  -MD        Barriers to Rehab (P)  none identified  -MD        Patient's Goals for Discharge (P)  return to all previous roles/activities  -MD           Pain Assessment    Additional Documentation (P)  Pain Scale: Word Pre/Post-Treatment (Group)   -MD           Pain Scale: Word Pre/Post-Treatment    Pain: Word Scale, Pretreatment (P)  0 - no pain  -MD        Pain: Word Scale, Post-Treatment (P)  0 - no pain  -MD           Comprehension Assessment/Intervention    Comprehension Assessment/Intervention (P)  Auditory Comprehension;Reading Comprehension  -MD           Auditory Comprehension Assessment/Intervention    Auditory Comprehension (Communication) (P)  WFL  -MD        Able to Identify Objects/Pictures (Communication) (P)  WFL;familiar objects;pictures of common objects;body part  -MD        Answers Questions (Communication) (P)  WFL;personal;complex;wh questions;yes/no  -MD        Able to Follow Commands (Communication) (P)  WFL;multi-step  -MD        Narrative Discourse (P)  WFL  -MD           Reading Comprehension Assessment/Intervention    Reading Comprehension (Communication) (P)  [facility]  -MD        Scanning (Reading) (P)  WFL;paragraphs  -MD        Paragraph Level (P)  [facility]  -MD        Functional Reading Tasks (P)  [facility]  -MD           Expression Assessment/Intervention    Expression Assessment/Intervention (P)  verbal expression  -MD           Verbal Expression Assessment/Intervention    Verbal Expression (P)  [facility]  -MD        Automatic Speech (Communication) (P)  response to greeting  -MD        Responsive Naming (P)  WFL  -MD        Confrontational Naming (P)  [facility]  -MD        Spontaneous/Functional Words (P)  [facility]  -MD        Conversational Discourse/Fluency (P)  [facility]  -MD           Oral Motor Structure and Function    Oral Motor Structure and Function (P)  WF  -MD        Dentition Assessment (P)  natural, present and adequate  -MD        Mucosal Quality (P)  moist, healthy  -MD           Oral Musculature and Cranial Nerve Assessment    Oral Motor General Assessment (P)  other (see comments)   R facial droop/weakness  -MD           Motor Speech Assessment/Intervention    Motor Speech Function (P)  mild impairment  -MD        Characteristics Consistent with  Dysarthria (P)  slurred speech  -MD        Initiation of Phonation (Communication) (P)  voluntary  -MD        Automatic Speech (Communication) (P)  WFL;response to greeting  -MD        Conversational Speech (Communication) (P)  WFL  -MD        Motor Speech, Comment (P)  Mildly slurred speech 2' R facial droop  -MD           Cursory Voice Assessment/Intervention    Quality and Resonance (Voice) (P)  WFL  -MD           Cognitive Assessment Intervention- SLP    Cognitive Function (Cognition) (P)  WFL  -MD        Orientation Status (Cognition) (P)  WFL;awareness of basic personal information;person;place;time;situation  -MD        Memory (Cognitive) (P)  WFL  -MD        Attention (Cognitive) (P)  WFL  -MD        Thought Organization (Cognitive) (P)  WFL  -MD        Reasoning (Cognitive) (P)  WFL  -MD        Problem Solving (Cognitive) (P)  unable/difficult to assess  -MD        Pragmatics (Communication) (P)  WFL  -MD           SLP Clinical Impressions    SLP Diagnosis (P)  Pt presents w/ mild dysarthria c/b imprecise articulation at the conversational level 2' R facial weakness. Receptive/expressive language and cog-com skills WFL. Suspected Bell's Palsy per MD note. SLP f/u x1 to provide strategies if symptoms have not improved.   -MD        Rehab Potential/Prognosis (P)  excellent  -MD        Criteria for Skilled Therapy Interventions Met (P)  yes  -MD        Functional Impact (P)  functional impact in social situations  -MD           Recommendations    Therapy Frequency (SLP) (P)  PRN  -MD        Predicted Duration Therapy Intervention (Days) (P)  until discharge  -MD        Anticipated Dischage Disposition (P)  home  -MD           Motor Speech/Dysarthria Treatment Objectives    Articulation Selection (P)  articulation, SLP goal 1  -MD           Articulation Goal 1 (SLP)    Improve Articulation Goal 1 (SLP) (P)  by over-articulating in connected speech;90%;independently (over 90% accuracy)  -MD        Time Frame  (Articulation Goal 1, SLP) (P)  short term goal (STG);by discharge  -MD        Progress/Outcomes (Articulation Goal 1, SLP) (P)  goal ongoing  -MD           Additional Goals    Additional Goal Selection (P)  additional goal, SLP goal (free text)  -MD           Additional Goal (SLP)    Additional Goal, SLP (P)  LTG: Patient will improve communication in order to optimally participate in care and in environment.  -MD        Time Frame (Additional Goal, SLP) (P)  by discharge  -MD        Progress/Outcomes (Additional Goal, SLP) (P)  goal ongoing  -MD          User Key  (r) = Recorded By, (t) = Taken By, (c) = Cosigned By    Initials Name Effective Dates    MD Tasia Alonso, Speech Therapy Student 03/07/18 -                EDUCATION  The patient has been educated in the following areas:     Cognitive Impairment Communication Impairment.    SLP Recommendation and Plan    SLP Diagnosis: (P) Pt presents w/ mild dysarthria c/b imprecise articulation at the conversational level 2' R facial weakness. Receptive/expressive language and cog-com skills WFL. Suspected Bell's Palsy per MD note. SLP f/u x1 to provide strategies if symptoms have not improved.     Rehab Potential/Prognosis: (P) excellent    Criteria for Skilled Therapy Interventions Met: (P) yes    Anticipated Dischage Disposition: (P) home         Therapy Frequency (SLP): (P) PRN    Predicted Duration Therapy Intervention (Days): (P) until discharge          Plan of Care Review  Plan of Care Reviewed With: (P) patient  Plan of Care Reviewed With: (P) patient  Progress: (P)  (eval)              SLP GOALS     Row Name 03/19/18 0900             Articulation Goal 1 (SLP)    Improve Articulation Goal 1 (SLP) (P)  by over-articulating in connected speech;90%;independently (over 90% accuracy)  -MD      Time Frame (Articulation Goal 1, SLP) (P)  short term goal (STG);by discharge  -MD      Progress/Outcomes (Articulation Goal 1, SLP) (P)  goal ongoing  -MD          Additional Goal (SLP)    Additional Goal, SLP (P)  LTG: Patient will improve communication in order to optimally participate in care and in environment.  -MD      Time Frame (Additional Goal, SLP) (P)  by discharge  -MD      Progress/Outcomes (Additional Goal, SLP) (P)  goal ongoing  -MD        User Key  (r) = Recorded By, (t) = Taken By, (c) = Cosigned By    Initials Name Provider Type    MD Tasia Alonso, Speech Therapy Student Speech Therapy Student                      Time Calculation:           Time Calculation- SLP     Row Name 03/19/18 1003             Time Calculation- SLP    SLP Start Time (P)  0900  -MD      SLP Received On (P)  03/19/18  -MD        User Key  (r) = Recorded By, (t) = Taken By, (c) = Cosigned By    Initials Name Provider Type    MD Tasia Alonso, Speech Therapy Student Speech Therapy Student          Therapy Charges for Today     Code Description Service Date Service Provider Modifiers Qty    03383297248 HC ST EVAL SPEECH AND PROD W LANG  3 3/19/2018 Tasia Alonso, Speech Therapy Student GN 1                     Tasia Alonso Speech Therapy Student  3/19/2018

## 2018-03-19 NOTE — NURSING NOTE
Patient called to report chest pressure.  Called rapid and went to patient's bedside.  B/P was slightly elevated at 142/75 and HR 89.  Placed patient on 3LNC.  O2 sats 95 on 3LNC.  EKG showed NSR with Sinus Arrythmia.  Otherwise normal EKG.  Stat troponin drawn and BGFS.  Glucose 141.  Waiting results on troponin.  Patient's chest discomfort subsided by the time RR team arrived.  Pt stated to me that he had the pressure for approximately 10 minutes before calling me.  Advised patient to call immediately if he feels chest discomfort of any kind.  Verbalized understanding.  Dr. Crenshaw paged stat.  He gave orders over phone which were read back and verified.  He arrived at bedside within three minutes.  Patient received a second ASA 81 mg, chewable.  No nitro was administered as discomfort subsided before RRT arrived.  Patient is in stable condition at this time with no c/o of chest discomfort now.

## 2018-03-19 NOTE — PLAN OF CARE
Problem: Patient Care Overview  Goal: Plan of Care Review  Outcome: Ongoing (interventions implemented as appropriate)   03/19/18 4282   Coping/Psychosocial   Plan of Care Reviewed With patient   Plan of Care Review   Progress (eval)     SLP evaluation completed. Will address mild dysarthria by following up x1 to provide compensatory strategy instruction if articulation difficulties persist. Please see note for further details and recommendations.

## 2018-03-19 NOTE — PLAN OF CARE
Problem: Patient Care Overview  Goal: Plan of Care Review  Outcome: Ongoing (interventions implemented as appropriate)   03/19/18 0549   Coping/Psychosocial   Plan of Care Reviewed With patient   OTHER   Outcome Summary Pt admitted from [facility] for rule out stroke. NP at bedside. Awaiting hospitalist arrival. VSS.

## 2018-03-19 NOTE — ED NOTES
[facility] returned call, confirmed to transport pt via BLS truck to [facility].      Dariela Pritchett  03/19/18 0314

## 2018-03-19 NOTE — PLAN OF CARE
Problem: Patient Care Overview  Goal: Plan of Care Review  Outcome: Ongoing (interventions implemented as appropriate)   03/19/18 1009   Coping/Psychosocial   Plan of Care Reviewed With patient   OTHER   Outcome Summary Pt with baseline status of independent functional mobility, no deficits noted requiring skilled svcs. Will d/c PT , anticipate return home. May benefit from outpt PT if Bell's Palsy diagnosed. Pt agrees to care plan   Coping/Psychosocial   Patient Agreement with Plan of Care agrees

## 2018-03-19 NOTE — PROGRESS NOTES
Discharge Planning Assessment  [facility]     Patient Name: Huseyin Ernandez  MRN: [id number]  Today's Date: 3/19/2018    Admit Date: 3/19/2018          Discharge Needs Assessment     Row Name 03/19/18 0956       Living Environment    Lives With spouse    Current Living Arrangements home/apartment/condo    Primary Care Provided by self    Provides Primary Care For no one    Family Caregiver if Needed spouse    Quality of Family Relationships unable to assess    Able to Return to Prior Arrangements yes       Resource/Environmental Concerns    Resource/Environmental Concerns none       Transition Planning    Patient/Family Anticipates Transition to home    Transportation Anticipated family or friend will provide       Discharge Needs Assessment    Readmission Within the Last 30 Days no previous admission in last 30 days    Concerns to be Addressed no discharge needs identified    Equipment Currently Used at Home none    Anticipated Changes Related to Illness none    Equipment Needed After Discharge none            Discharge Plan     Row Name 03/19/18 0958       Plan    Plan Home    Patient/Family in Agreement with Plan yes    Plan Comments Spoke with patient in room to initiate discharge planning.  He lives with his spouse in [facility].  He is independent with ADL's.  He has no DME at home and has never had home health services.  Mr. Ernandez has RX coverage and has his scripts filled at [facility].  His goal is to return home with  at discharge.  Will await recommendations from therapy to determine proper discharge placement.  CM will continue to follow.  Casi Sierra RN [phone]        Destination     No service coordination in this encounter.      Durable Medical Equipment     No service coordination in this encounter.      Dialysis/Infusion     No service coordination in this encounter.      Home Medical Care     No service coordination in this encounter.      Social Care     No service coordination in this  encounter.        Expected Discharge Date and Time     Expected Discharge Date Expected Discharge Time    Mar 21, 2018               Demographic Summary     Row Name 03/19/18 0956       General Information    Admission Type inpatient    Arrived From home    Referral Source admission list    Reason for Consult discharge planning    Preferred Language English     Used During This Interaction no    General Information Comments PCP-Romain Koehler            Functional Status     Row Name 03/19/18 0956       Functional Status    Usual Activity Tolerance excellent    Current Activity Tolerance excellent       Functional Status, IADL    Medications independent    Meal Preparation independent    Housekeeping independent    Laundry independent    Shopping independent            Psychosocial    No documentation.           Abuse/Neglect    No documentation.           Legal    No documentation.           Substance Abuse    No documentation.           Patient Forms    No documentation.         Casi Sierra RN

## 2018-03-19 NOTE — ED NOTES
Karina Buckley hospitalist at [facility]      Baylee Winter, RN  03/19/18 0186

## 2018-03-19 NOTE — ED NOTES
[facility] called to transport pt to [facility]. Waiting on call from [facility].      Dariela Pritchett  03/19/18 1907

## 2018-03-19 NOTE — H&P
[facility] Medicine Services  HISTORY AND PHYSICAL    Patient Name: Huseyin Ernandez  : 1980  MRN: [id number]  Primary Care Physician: Romain Koehler MD    Subjective   Subjective     Chief Complaint: Facial droop    HPI:  Huseyin Ernandez is a 37 y.o. male who presents to [facility] with complaints of right facial droop and headache.  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  Patient denies any difficulty with speech, chest pain, shortness of breath, abdominal pain, limb weakness, or changes to bowel or bladder.  Patient continued to work his shift until midnight when his coworkers forced him to be evaluated in the emergency department.  While emergency department patient had negative CT, but was transferred to [facility] for higher level care for further evaluation.  Patient will be admitted to the hospital medicine service.    Review of Systems   Constitutional: Negative for chills, diaphoresis, fatigue and fever.   Respiratory: Negative for cough, wheezing and stridor.    Cardiovascular: Negative for chest pain, palpitations and leg swelling.   Gastrointestinal: Negative for abdominal pain, nausea and vomiting.   Genitourinary: Negative for dysuria, frequency and urgency.   Musculoskeletal: Negative for arthralgias and myalgias.   Skin: Negative for color change, pallor, rash and wound.   Neurological: Positive for facial asymmetry and headaches. Negative for dizziness, speech difficulty, weakness and numbness.   Psychiatric/Behavioral: Negative for agitation and confusion.   All other systems reviewed and are negative.     Otherwise 10-system ROS reviewed and is negative except as mentioned in the HPI.    Personal History     Past Medical History:   Diagnosis Date   • GERD (gastroesophageal reflux disease)    • Hypercholesteremia         Past Surgical History:   Procedure Laterality Date   • CYST REMOVAL         Family History: family history includes Arthritis in his father and mother; Diabetes in his father; Heart disease in his father; Hypertension in his father; No Known Problems in his brother.     Social History:  reports that he has been smoking Cigarettes.  He has been smoking about 2.00 packs per day. His smokeless tobacco use includes Chew. He reports that he does not drink alcohol or use drugs.  Social History     Social History Narrative   • No narrative on file       Medications:  Prescriptions Prior to Admission   Medication Sig Dispense Refill Last Dose   • aspirin 81 MG chewable tablet Chew 81 mg daily.      • baclofen (LIORESAL) 10 MG tablet Take 10 mg by mouth daily.      • EPINEPHrine (EPIPEN 2-AMANDA) 0.3 MG/0.3ML solution auto-injector injection Inject 0.3 mL into the shoulder, thigh, or buttocks 1 (one) time for 1 dose. 1 each 0    • omeprazole (PriLOSEC) 20 MG capsule Take 20 mg by mouth daily.          Allergies   Allergen Reactions   • Bee Venom    • Latex    • Morphine And Related    • Pneumococcal Vaccines Hives   • Ppd [Tuberculin Purified Protein Derivative]        Objective   Objective     Vital Signs:   Temp:  [98 °F (36.7 °C)-98.4 °F (36.9 °C)] 98 °F (36.7 °C)  Heart Rate:  [] 90  Resp:  [18] 18  BP: (114-138)/(69-85) 123/80        Physical Exam   Constitutional: He is oriented to person, place, and time. He appears well-developed and well-nourished.   HENT:   Head: Normocephalic and atraumatic.   Eyes: EOM are normal. Pupils are equal, round, and reactive to light.   Neck: Normal range of motion. Neck supple. No JVD present.   Cardiovascular: Normal rate, regular rhythm, normal heart sounds and intact distal pulses.  Exam reveals no gallop and no friction rub.    No murmur heard.  Pulmonary/Chest: Effort normal and breath sounds normal. No respiratory distress. He has no wheezes. He has no rales. He exhibits  no tenderness.   Abdominal: Soft. Bowel sounds are normal. He exhibits no distension and no mass. There is no tenderness. There is no rebound and no guarding. No hernia.   Musculoskeletal: Normal range of motion. He exhibits no edema, tenderness or deformity.   Neurological: He is alert and oriented to person, place, and time. A cranial nerve deficit is present.   Skin: Skin is warm and dry. Capillary refill takes less than 2 seconds. No rash noted. No erythema. No pallor.   Psychiatric: He has a normal mood and affect. His behavior is normal. Judgment and thought content normal.   Nursing note and vitals reviewed.     Results Reviewed:  I have personally reviewed current lab, radiology, and data and agree.      Results from last 7 days  Lab Units 03/19/18  0114   WBC 10*3/mm3 13.23*   HEMOGLOBIN g/dL 15.2   HEMATOCRIT % 45.3   PLATELETS 10*3/mm3 293   INR  0.94       Results from last 7 days  Lab Units 03/19/18  0114   SODIUM mmol/L 140   POTASSIUM mmol/L 3.6   CHLORIDE mmol/L 109   CO2 mmol/L 24.4   BUN mg/dL 13   CREATININE mg/dL 0.68   GLUCOSE mg/dL 131*   CALCIUM mg/dL 9.5   ALT (SGPT) U/L 31   AST (SGOT) U/L 15   TROPONIN I ng/mL <0.006     Estimated Creatinine Clearance: 208.3 mL/min (by C-G formula based on SCr of 0.68 mg/dL).  Brief Urine Lab Results  (Last result in the past 365 days)      Color   Clarity   Blood   Leuk Est   Nitrite   Protein   CREAT   Urine HCG        03/19/18 0125 Yellow Clear Negative Negative Negative Negative             No results found for: BNP  No results found for: PHART  Imaging Results (last 24 hours)     ** No results found for the last 24 hours. **             Assessment/Plan   Assessment / Plan     Hospital Problem List     CVA (cerebral vascular accident)    Arthritis    GERD (gastroesophageal reflux disease)    Tobacco use    Obesity            Assessment & Plan:  - Admit to telemetry  - VS q4h  - Neuro checks q4h  - NIHSS qshift  - Consult to neurology   - r/o CVA   -  More likely Bell's Palsy  - MRI  - Carotid duplex  - AM labs  - SLP Consult  - PT/OT Consult  - Case management    - Will start steroids and valtrex    DVT prophylaxis: TEDs/SCDs    CODE STATUS: FULL      Admission Status:  I believe this patient meets OBSERVATION status, however if further evaluation or treatment plans warrant, status may change.  Based upon current information, I predict patient's care encounter to be less than or equal to 2 midnights.    Electronically signed by MARGARETH Mixon, 03/19/18, 6:00 AM.      Brief Attending Admission Attestation     I have seen and examined the patient, performing an independent face-to-face diagnostic evaluation with plan of care reviewed and developed with the advanced practice clinician (APC).      Brief Summary Statement/HPI:   Huseyin Ernandez is a 37 y.o. male who was transferred from [facility] for possible stroke.Pt reports sudden onset of right facial weakness. He denies weakness or numbness of the extremities. No report of swallowing dysfunction.  Head CT scan was unremarkable. Pt has isolated rightfacial weakness most likely related to Bell's palsy. Pt reports right ear ache as well as blisters in his upper palate.     Attending Physical Exam:   Constitutional: No acute distress, awake, alert  Eyes: PERRLA, sclerae anicteric, no conjunctival injection. There is obvious right sided facial weakness with deviation to the left side.  HENT: NCAT, mucous membranes moist  Neck: Supple, no thyromegaly, no lymphadenopathy, trachea midline  Respiratory: Clear to auscultation bilaterally, nonlabored respirations   Cardiovascular: RRR, no murmurs, rubs, or gallops, palpable pedal pulses bilaterally  Gastrointestinal: Positive bowel sounds, soft, nontender, nondistended  Musculoskeletal: No bilateral ankle edema, no clubbing or cyanosis to extremities  Psychiatric: Appropriate affect, cooperative  Neurologic: Oriented x 3, strength symmetric in all extremities, Cranial  Nerves grossly intact to confrontation, speech clear  Skin: No rashes     Brief Assessment/Plan :  Isolated right facial nerve palsy (Bell's palsy). Will start prednisone and acyclovir. Will await neurologist evaluation. Patient is allowed to eat.    See above for further detailed assessment and plan developed with APC which I have reviewed and/or edited.      Electronically signed by Yayo Buckley MD, 03/19/18, 6:17 AM.

## 2018-03-20 VITALS
BODY MASS INDEX: 46.65 KG/M2 | HEART RATE: 98 BPM | OXYGEN SATURATION: 96 % | HEIGHT: 69 IN | DIASTOLIC BLOOD PRESSURE: 82 MMHG | TEMPERATURE: 98.3 F | WEIGHT: 315 LBS | RESPIRATION RATE: 18 BRPM | SYSTOLIC BLOOD PRESSURE: 135 MMHG

## 2018-03-20 LAB
ALBUMIN SERPL-MCNC: 3.8 G/DL (ref 3.2–4.8)
ALBUMIN/GLOB SERPL: 1.8 G/DL (ref 1.5–2.5)
ALP SERPL-CCNC: 73 U/L (ref 25–100)
ALT SERPL W P-5'-P-CCNC: 28 U/L (ref 7–40)
ANION GAP SERPL CALCULATED.3IONS-SCNC: 10 MMOL/L (ref 3–11)
AST SERPL-CCNC: 11 U/L (ref 0–33)
BASOPHILS # BLD AUTO: 0.03 10*3/MM3 (ref 0–0.2)
BASOPHILS NFR BLD AUTO: 0.2 % (ref 0–1)
BILIRUB SERPL-MCNC: 0.4 MG/DL (ref 0.3–1.2)
BUN BLD-MCNC: 13 MG/DL (ref 9–23)
BUN/CREAT SERPL: 21.7 (ref 7–25)
CALCIUM SPEC-SCNC: 8.6 MG/DL (ref 8.7–10.4)
CHLORIDE SERPL-SCNC: 105 MMOL/L (ref 99–109)
CO2 SERPL-SCNC: 22 MMOL/L (ref 20–31)
CREAT BLD-MCNC: 0.6 MG/DL (ref 0.6–1.3)
DEPRECATED RDW RBC AUTO: 45.1 FL (ref 37–54)
EOSINOPHIL # BLD AUTO: 0.18 10*3/MM3 (ref 0–0.3)
EOSINOPHIL NFR BLD AUTO: 1.2 % (ref 0–3)
ERYTHROCYTE [DISTWIDTH] IN BLOOD BY AUTOMATED COUNT: 13.9 % (ref 11.3–14.5)
GFR SERPL CREATININE-BSD FRML MDRD: >150 ML/MIN/1.73
GLOBULIN UR ELPH-MCNC: 2.1 GM/DL
GLUCOSE BLD-MCNC: 134 MG/DL (ref 70–100)
GLUCOSE BLDC GLUCOMTR-MCNC: 112 MG/DL (ref 70–130)
HCT VFR BLD AUTO: 44.2 % (ref 38.9–50.9)
HGB BLD-MCNC: 14.2 G/DL (ref 13.1–17.5)
IMM GRANULOCYTES # BLD: 0.07 10*3/MM3 (ref 0–0.03)
IMM GRANULOCYTES NFR BLD: 0.5 % (ref 0–0.6)
LYMPHOCYTES # BLD AUTO: 4.67 10*3/MM3 (ref 0.6–4.8)
LYMPHOCYTES NFR BLD AUTO: 32.3 % (ref 24–44)
MAGNESIUM SERPL-MCNC: 2 MG/DL (ref 1.3–2.7)
MCH RBC QN AUTO: 28.7 PG (ref 27–31)
MCHC RBC AUTO-ENTMCNC: 32.1 G/DL (ref 32–36)
MCV RBC AUTO: 89.5 FL (ref 80–99)
MONOCYTES # BLD AUTO: 1.19 10*3/MM3 (ref 0–1)
MONOCYTES NFR BLD AUTO: 8.2 % (ref 0–12)
NEUTROPHILS # BLD AUTO: 8.34 10*3/MM3 (ref 1.5–8.3)
NEUTROPHILS NFR BLD AUTO: 57.6 % (ref 41–71)
PHOSPHATE SERPL-MCNC: 4.8 MG/DL (ref 2.4–5.1)
PLATELET # BLD AUTO: 286 10*3/MM3 (ref 150–450)
PMV BLD AUTO: 10 FL (ref 6–12)
POTASSIUM BLD-SCNC: 3.7 MMOL/L (ref 3.5–5.5)
PROT SERPL-MCNC: 5.9 G/DL (ref 5.7–8.2)
RBC # BLD AUTO: 4.94 10*6/MM3 (ref 4.2–5.76)
SODIUM BLD-SCNC: 137 MMOL/L (ref 132–146)
WBC NRBC COR # BLD: 14.48 10*3/MM3 (ref 3.5–10.8)

## 2018-03-20 PROCEDURE — 93005 ELECTROCARDIOGRAM TRACING: CPT | Performed by: INTERNAL MEDICINE

## 2018-03-20 PROCEDURE — 63710000001 PREDNISONE PER 1 MG: Performed by: PSYCHIATRY & NEUROLOGY

## 2018-03-20 PROCEDURE — 84100 ASSAY OF PHOSPHORUS: CPT | Performed by: INTERNAL MEDICINE

## 2018-03-20 PROCEDURE — G0108 DIAB MANAGE TRN  PER INDIV: HCPCS

## 2018-03-20 PROCEDURE — 82962 GLUCOSE BLOOD TEST: CPT

## 2018-03-20 PROCEDURE — 85025 COMPLETE CBC W/AUTO DIFF WBC: CPT | Performed by: INTERNAL MEDICINE

## 2018-03-20 PROCEDURE — 99217 PR OBSERVATION CARE DISCHARGE MANAGEMENT: CPT | Performed by: INTERNAL MEDICINE

## 2018-03-20 PROCEDURE — 83735 ASSAY OF MAGNESIUM: CPT | Performed by: INTERNAL MEDICINE

## 2018-03-20 PROCEDURE — 80053 COMPREHEN METABOLIC PANEL: CPT | Performed by: INTERNAL MEDICINE

## 2018-03-20 PROCEDURE — 93010 ELECTROCARDIOGRAM REPORT: CPT | Performed by: INTERNAL MEDICINE

## 2018-03-20 PROCEDURE — G0378 HOSPITAL OBSERVATION PER HR: HCPCS

## 2018-03-20 RX ORDER — PREDNISONE 10 MG/1
5 TABLET ORAL TAKE AS DIRECTED
Status: SHIPPED | OUTPATIENT
Start: 2018-03-25

## 2018-03-20 RX ORDER — PREDNISONE 20 MG/1
60 TABLET ORAL DAILY
Qty: 12 TABLET | Refills: 0 | Status: SHIPPED | OUTPATIENT
Start: 2018-03-20 | End: 2018-03-24

## 2018-03-20 RX ORDER — VALACYCLOVIR HYDROCHLORIDE 1 G/1
1000 TABLET, FILM COATED ORAL EVERY 8 HOURS SCHEDULED
Qty: 17 TABLET | Refills: 0 | Status: SHIPPED | OUTPATIENT
Start: 2018-03-20 | End: 2018-03-26

## 2018-03-20 RX ORDER — EPINEPHRINE 0.3 MG/.3ML
0.3 INJECTION SUBCUTANEOUS ONCE
Qty: 1 EACH | Refills: 0 | Status: SHIPPED | OUTPATIENT
Start: 2018-03-20 | End: 2018-03-20

## 2018-03-20 RX ORDER — PREDNISONE 20 MG/1
60 TABLET ORAL
Status: DISCONTINUED | OUTPATIENT
Start: 2018-03-20 | End: 2018-03-20 | Stop reason: HOSPADM

## 2018-03-20 RX ADMIN — PANTOPRAZOLE SODIUM 40 MG: 40 TABLET, DELAYED RELEASE ORAL at 05:29

## 2018-03-20 RX ADMIN — PREDNISONE 60 MG: 20 TABLET ORAL at 11:55

## 2018-03-20 RX ADMIN — ASPIRIN 81 MG 81 MG: 81 TABLET ORAL at 08:24

## 2018-03-20 RX ADMIN — VALACYCLOVIR HYDROCHLORIDE 1000 MG: 500 TABLET, FILM COATED ORAL at 05:29

## 2018-03-20 RX ADMIN — NICOTINE 1 PATCH: 21 PATCH, EXTENDED RELEASE TRANSDERMAL at 08:26

## 2018-03-20 NOTE — DISCHARGE SUMMARY
[facility] Medicine Services  DISCHARGE SUMMARY    Patient Name: Huseyin Ernandez  : 1980  MRN: [id number]    Date of Admission: 3/19/2018  Date of Discharge:  3/20/18  Primary Care Physician: Romain Koehler MD    Consults     Date and Time Order Name Status Description    3/19/2018 0600 Inpatient Consult to Neurology Completed         Hospital Course     Presenting Problem:       Active Hospital Problems (** Indicates Principal Problem)    Diagnosis Date Noted   • Right sided [place] palsy 2018   • Arthritis [M19.90] 2018   • GERD (gastroesophageal reflux disease) [K21.9] 2018   • Tobacco use [Z72.0] 2018   • Obesity [E66.9] 2018   • Elevated A1c 2018   • Hyperglycemia [R73.9] 2018      Resolved Hospital Problems    Diagnosis Date Noted Date Resolved   No resolved problems to display.          Hospital Course:  Huseyin Ernandez is a 37 y.o. male with past medical history significant for GERD, arthritis, tobacco abuse.  Patient smokes 2 packs a day.  Patient also has strong family history of heart disease.  Patient was transferred to [facility] for right facial weakness which was thought to be secondary to stroke.  He'll be admitted the patient and monitor him on telemetry.  MRI was done which did not show any acute process.  Neurology also was consulted and Dr. Duke saw evaluated and followed the patient why he was here.  Patient's diagnosis was actually Bell's palsy.  Patient was started on prednisone and Valtrex.  Patient also had one episode of chest discomfort.  We did an echocardiogram which basically was a normal study.  We will also trended troponin and 3 sets were all negative.  Patient's hemoglobin A1c also is elevated at 6.7 and his morning sugar also was elevated.  Abdomen long conversation with the patient about diabetes.  He also consult it diabetic educator and patient received counseling.  We'll defer pharmacologic  treatment to the primary care physician.  I told the patient that he needs to do regular exercise, stay on diabetic diet, lose weight.  Also I recommend metformin treatment if patient can tolerate it.  Again as mentioned above with defer pharmacologic treatment to the primary care physician who follows a patient.  Currently patient is stable, labs and vitals are within acceptable limits patient is very eager to go home and we will discharge patient home to follow-up with the primary care physician.  Should he mentioned that patient is on Valtrex and prednisone for treatment of Bell's palsy.  After taking prednisone 60 mg by mouth daily for 4 more days she needs to be tapered down.           Day of Discharge         Review of Systems      Otherwise ROS is negative except as mentioned in the HPI.    Vital Signs:   Temp:  [97.8 °F (36.6 °C)-98.7 °F (37.1 °C)] 98.3 °F (36.8 °C)  Heart Rate:  [] 98  Resp:  [18] 18  BP: (116-140)/(66-83) 135/82     Physical Exam:  Constitutional: No acute distress, awake, alert  Eyes: PERRLA, sclerae anicteric, no conjunctival injection  HENT: NCAT, mucous membranes moist  Neck: Supple, no thyromegaly, no lymphadenopathy, trachea midline  Respiratory: Clear to auscultation bilaterally, nonlabored respirations   Cardiovascular: RRR, no murmurs, rubs, or gallops, palpable pedal pulses bilaterally  Gastrointestinal: Very obese abdomen.  Positive bowel sounds, soft, nontender, nondistended  Musculoskeletal: Morbidly obese.  He is trace edema lower extremities bilaterally.    Psychiatric: Appropriate affect, cooperative  Neurologic: Oriented x 3, strength symmetric in all extremities, Cranial Nerves grossly intact to confrontation, speech clear  Skin: No rashes    Pertinent  and/or Most Recent Results       Results from last 7 days  Lab Units 03/20/18  0354 03/19/18  0612 03/19/18  0114   WBC 10*3/mm3 14.48* 14.22* 13.23*   HEMOGLOBIN g/dL 14.2 14.9 15.2   HEMATOCRIT % 44.2 45.9 45.3    PLATELETS 10*3/mm3 286 297 293   SODIUM mmol/L 137 137 140   POTASSIUM mmol/L 3.7 3.5 3.6   CHLORIDE mmol/L 105 106 109   CO2 mmol/L 22.0 22.0 24.4   BUN mg/dL 13 13 13   CREATININE mg/dL 0.60 0.60 0.68   GLUCOSE mg/dL 134* 145* 131*   CALCIUM mg/dL 8.6* 9.0 9.5       Results from last 7 days  Lab Units 03/20/18  0354 03/19/18  0612 03/19/18  0114   BILIRUBIN mg/dL 0.4 0.3 0.3   ALK PHOS U/L 73 78 72   ALT (SGPT) U/L 28 28 31   AST (SGOT) U/L 11 11 15   PROTIME Seconds  --   --  12.7   INR   --   --  0.94   APTT seconds  --   --  30.7       Results from last 7 days  Lab Units 03/19/18  0612   CHOLESTEROL mg/dL 147   TRIGLYCERIDES mg/dL 211*   HDL CHOL mg/dL 33*       Results from last 7 days  Lab Units 03/19/18  1749 03/19/18  1345 03/19/18  1013 03/19/18  0612 03/19/18  0114   TSH mIU/mL  --   --   --  2.334  --    HEMOGLOBIN A1C %  --   --   --  6.70*  --    TROPONIN I ng/mL <0.006 <0.006 <0.006  --  <0.006     Brief Urine Lab Results  (Last result in the past 365 days)      Color   Clarity   Blood   Leuk Est   Nitrite   Protein   CREAT   Urine HCG        03/19/18 0125 Yellow Clear Negative Negative Negative Negative               Microbiology Results Abnormal     None          Imaging Results (all)     Procedure Component Value Units Date/Time    MRI Brain Without Contrast [[id number]] Collected:  03/20/18 0750     Updated:  03/20/18 1026    Narrative:       EXAMINATION: MRI BRAIN WO CONTRAST-03/19/2018:     INDICATION: Stroke; Z74.09-Other reduced mobility; Z74.09-Other reduced  mobility; 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      TECHNIQUE: Multiplanar MRI of the brain without intravenous contrast  administration.     COMPARISON: NONE.     FINDINGS: No restriction on diffusion-weighted sequences. Midline  structures are symmetric without evidence of mass, mass effect or  midline shift. Pituitary and sella within normal limits.  Cervicomedullary junction widely patent. Globes and  orbits retain normal  T2 signal characteristics. The visualized paranasal sinuses demonstrate  mucus retention cyst right maxillary sinus otherwise grossly clear and  well pneumatized. Normal signal flow voids of the distal internal  carotid and basilar arteries. Ventricles and sulci are within normal  limits with cavum septum pellucidum formation identified.       Impression:       No acute intracranial abnormality specifically, no evidence  for acute ischemia.     D:  03/20/2018  E:  03/20/2018            This report was finalized on 3/20/2018 10:24 AM by Dr. Remigio Salomon.             Results for orders placed during the hospital encounter of 03/19/18   Bilateral Carotid Duplex    Narrative · Proximal right internal carotid artery is normal.  · Proximal left internal carotid artery is normal.          Results for orders placed during the hospital encounter of 03/19/18   Bilateral Carotid Duplex    Narrative · Proximal right internal carotid artery is normal.  · Proximal left internal carotid artery is normal.          Results for orders placed during the hospital encounter of 03/19/18   Adult Transthoracic Echo Complete W/ Cont if Necessary Per Protocol (With Agitated Saline)    Narrative · Left ventricular systolic function is normal.  · Estimated EF appears to be in the range of 61 - 65%.  · Patent foramen ovale present. Saline test results are positive.            Discharge Details      Huseyin Ernandez   Home Medication Instructions JULIANE:[id number]    Printed on:03/20/18 1058   Medication Information                      aspirin 81 MG chewable tablet  Chew 81 mg daily.             baclofen (LIORESAL) 10 MG tablet  Take 10 mg by mouth daily.             celecoxib (CeleBREX) 200 MG capsule  Take 200 mg by mouth Daily.             EPINEPHrine (EPIPEN 2-AMANDA) 0.3 MG/0.3ML solution auto-injector injection  Inject 0.3 mL into the shoulder, thigh, or buttocks 1 (One) Time for 1 dose.             omeprazole (PriLOSEC) 20  MG capsule  Take 20 mg by mouth daily.             predniSONE (DELTASONE) 20 MG tablet  Take 3 tablets by mouth Daily for 4 days. Then tapering dose as prescribed.             valACYclovir (VALTREX) 1000 MG tablet  Take 1 tablet by mouth Every 8 (Eight) Hours for 17 doses.                   Discharge Disposition:  Home or Self Care    Discharge Diet:  Diet Instructions     Diet: Regular, Consistent Carbohydrate, Cardiac       Discharge Diet:   Regular  Consistent Carbohydrate  Cardiac             Discharge Activity:   Activity Instructions     Activity as Tolerated             Special Instructions:      No future appointments.    Additional Instructions for the Follow-ups that You Need to Schedule     Discharge Follow-up with PCP    As directed      Follow Up Details:  within one week               Time Spent on Discharge:  45 minutes    Electronically signed by Pola Crenshaw MD, 03/20/18, 10:55 AM.

## 2018-03-20 NOTE — CONSULTS
"Diabetes Education  Assessment/Teaching    Patient Name:  Huseyin Ernandez  YOB: 1980  MRN: [id number]  Admit Date:  3/19/2018      Assessment Date:  3/20/2018  Flowsheet Row Most Recent Value   General Information    Referral From: A1c, Blood glucose, MD order   Height 175.3 cm (69\")   Height Method Stated   Weight (!)  147 kg (323 lb)   Weight Method Standing scale   Pregnancy Assessment   Diabetes History   What type of diabetes do you have? Type 2   Length of Diabetes Diagnosis Newly diagnosed <6 months   Current DM knowledge poor   Have you had diabetes education/teaching in the past? no   Do you test your blood sugar at home? yes   Frequency of checks 1-2 times weekly   Meter type Reli-on   Who performs the test? self   Typical readings 92 - 150   Have you had low blood sugar? (<70mg/dl) no   Do you have any diabetes complications? circulation problems   Education Preferences   What areas of diabetes would you like to learn about? avoiding high blood sugar, diabetes complications, diet information, exercise information, medications for diabetes, resources on diabetes, stress/coping skills, testing my blood sugar at home, understanding diabetes   Nutrition Information   What is the biggest challenge you have with your diet? Poor choices, Knowledge   Assessment Topics   Healthy Eating - Assessment Needs education   Being Active - Assessment Needs education   Taking Medication - Assessment Needs education   Problem Solving - Assessment Needs education   Reducing Risk - Assessment Needs education   Healthy Coping - Assessment Needs education   Monitoring - Assessment Needs education   DM Goals   Healthy Eating - Goal 0-30 days from discharge   Being Active - Goal 0-30 days from discharge   Taking Medication - Goal 0-30 days from discharge   Problem Solving - Goal 0-30 days from discharge   Reducing Risk - Goal 0-30 days from discharge   Healthy Coping - Goal 0-30 days from discharge   Monitoring - Goal " 0-30 days from discharge   Contact Plan Offered/declined          Flowsheet Row Most Recent Value   DM Education Needs   Meter Meter provided   Meter Type Contour   Frequency of Testing 2 times a day   Blood Glucose Target Range    Problem Solving Hypoglycemia, Hyperglycemia, Sick days, Signs, Symptoms, Treatment   Reducing Risks A1C testing, Blood pressure, Eye exam, Immunizations, Cardiovascular   Healthy Eating Reviewed meal plan   Physical Activity Walking   Physical Activity Frequency Occasionally   Healthy Coping Appropriate   Discharge Plan Follow-up with PCP   Motivation Moderate   Teaching Method Explanation, Discussion, Handouts, Teach back   Patient Response Verbalized understanding            Other Comments:    Pt has a family hx of diabetes and stated that he has been told before that he is borderline.  Discussed and taught patient about type 2 diabetes self-management, risk factors, and importance of blood glucose control to reduce complications. Target blood glucose readings and A1c goals per ADA were reviewed. Reviewed with patient current A1c and discussed its significance. Signs, symptoms and treatment of hyperglycemia and hypoglycemia were discussed. Lifestyle changes such as physical activity with MD approval and healthy eating were encouraged. Donated glucose meter provided. Instructed to check expiration date and safe storage of glucose test strips, how to do a control test, prepare lancing device, obtain a sample, and perform test, safe disposal of lancets. Urged to call [phone] number on back of meter if have any difficulties, complete the warranty card and mail.  Urged patient to obtain prescriptions for test strips and lancets from provider.  Stressed the importance of strict blood sugar control to prevent complications.  Pt instructed to  check blood sugar 2 times per day and to call PCP if  Blood glucose is higher than 180 two times or more.  Patient was encouraged to keep record of  blood glucose readings to take to follow up appointment with PCP.  Pt was offered a free op follow up appointment however declined at this time.          Electronically signed by:  Alfredo Jackson RN  03/20/18 3:51 PM

## 2018-03-20 NOTE — H&P
Patient seen and examined at the bedside.  Saw this patient a few times today.  Earlier in the morning patient episodes of very sharp chest tightness with a very short duration.  A rapid response was called by the nurses and I saw the patient immediately after the rapid response was called.  Patient's chest pain had resolved.  EKG was showing no significant acute changes.  Troponin was ordered and patient was also put on aspirin.  Later on to put troponin was actually negative.  Also talk to neurology and as far as their consent wishing to go home.  However patient has a very strong family history of heart disease and also himself is a heavy smoker.  Reported echocardiogram and would like to monitor overnight and we will discharge her tomorrow if everything is stable and acceptable.

## 2018-03-20 NOTE — PLAN OF CARE
Problem: Patient Care Overview  Goal: Plan of Care Review  Outcome: Ongoing (interventions implemented as appropriate)   03/20/18 0071   Coping/Psychosocial   Plan of Care Reviewed With patient   OTHER   Outcome Summary Pt rested throughout the shift. MRI results pending. 2L NC applied while pt sleeping. Episodes of bradycardia with pauses during the night. EKG obtained- pt asymptomatic. Will cont to monitor.    Plan of Care Review   Progress no change

## 2021-03-18 ENCOUNTER — BULK ORDERING (OUTPATIENT)
Dept: CASE MANAGEMENT | Facility: OTHER | Age: 41
End: 2021-03-18

## 2021-03-18 DIAGNOSIS — Z23 IMMUNIZATION DUE: ICD-10-CM

## 2021-03-26 ENCOUNTER — TRANSCRIBE ORDERS (OUTPATIENT)
Dept: ADMINISTRATIVE | Facility: HOSPITAL | Age: 41
End: 2021-03-26

## 2021-03-26 DIAGNOSIS — Z01.818 PRE-OPERATIVE CLEARANCE: Primary | ICD-10-CM

## 2021-03-29 ENCOUNTER — LAB (OUTPATIENT)
Dept: LAB | Facility: HOSPITAL | Age: 41
End: 2021-03-29

## 2021-03-29 DIAGNOSIS — Z01.818 PRE-OPERATIVE CLEARANCE: ICD-10-CM

## 2021-03-29 LAB — SARS-COV-2 RNA NOSE QL NAA+PROBE: NOT DETECTED

## 2021-03-29 PROCEDURE — C9803 HOPD COVID-19 SPEC COLLECT: HCPCS

## 2021-03-29 PROCEDURE — U0004 COV-19 TEST NON-CDC HGH THRU: HCPCS | Performed by: OPHTHALMOLOGY

## 2021-04-08 ENCOUNTER — TRANSCRIBE ORDERS (OUTPATIENT)
Dept: ADMINISTRATIVE | Facility: HOSPITAL | Age: 41
End: 2021-04-08

## 2021-04-08 DIAGNOSIS — Z01.818 PRE-OPERATIVE CLEARANCE: Primary | ICD-10-CM

## 2021-04-10 ENCOUNTER — HOSPITAL ENCOUNTER (EMERGENCY)
Dept: HOSPITAL 79 - ER1 | Age: 41
LOS: 1 days | Discharge: HOME | End: 2021-04-11
Payer: COMMERCIAL

## 2021-04-10 DIAGNOSIS — Z88.5: ICD-10-CM

## 2021-04-10 DIAGNOSIS — E11.9: ICD-10-CM

## 2021-04-10 DIAGNOSIS — K57.32: Primary | ICD-10-CM

## 2021-04-10 DIAGNOSIS — F17.290: ICD-10-CM

## 2021-04-10 LAB
BUN/CREATININE RATIO: 17 (ref 0–10)
HGB BLD-MCNC: 15.3 GM/DL (ref 14–17.5)
RED BLOOD COUNT: 5.21 M/UL (ref 4.2–5.5)
WHITE BLOOD COUNT: 17.3 K/UL (ref 4.5–11)

## 2021-04-12 ENCOUNTER — LAB (OUTPATIENT)
Dept: LAB | Facility: HOSPITAL | Age: 41
End: 2021-04-12

## 2021-04-12 DIAGNOSIS — Z01.818 PRE-OPERATIVE CLEARANCE: ICD-10-CM

## 2021-04-12 LAB — SARS-COV-2 RNA NOSE QL NAA+PROBE: NOT DETECTED

## 2021-04-12 PROCEDURE — U0004 COV-19 TEST NON-CDC HGH THRU: HCPCS

## 2021-04-12 PROCEDURE — C9803 HOPD COVID-19 SPEC COLLECT: HCPCS

## 2021-04-18 ENCOUNTER — HOSPITAL ENCOUNTER (EMERGENCY)
Dept: HOSPITAL 79 - ER1 | Age: 41
Discharge: HOME | End: 2021-04-18
Payer: COMMERCIAL

## 2021-04-18 DIAGNOSIS — K57.32: Primary | ICD-10-CM

## 2021-04-18 LAB
BUN/CREATININE RATIO: 16 (ref 0–10)
HGB BLD-MCNC: 14.8 GM/DL (ref 14–17.5)
RED BLOOD COUNT: 5.06 M/UL (ref 4.2–5.5)
WHITE BLOOD COUNT: 15.2 K/UL (ref 4.5–11)

## 2021-04-24 ENCOUNTER — HOSPITAL ENCOUNTER (INPATIENT)
Dept: HOSPITAL 79 - ER1 | Age: 41
LOS: 7 days | Discharge: HOME | DRG: 392 | End: 2021-05-01
Attending: INTERNAL MEDICINE | Admitting: EMERGENCY MEDICINE
Payer: COMMERCIAL

## 2021-04-24 VITALS — HEIGHT: 67 IN | BODY MASS INDEX: 47.09 KG/M2 | WEIGHT: 300 LBS

## 2021-04-24 DIAGNOSIS — Z82.3: ICD-10-CM

## 2021-04-24 DIAGNOSIS — Z88.6: ICD-10-CM

## 2021-04-24 DIAGNOSIS — A56.19: ICD-10-CM

## 2021-04-24 DIAGNOSIS — E66.01: ICD-10-CM

## 2021-04-24 DIAGNOSIS — Z82.49: ICD-10-CM

## 2021-04-24 DIAGNOSIS — N50.819: ICD-10-CM

## 2021-04-24 DIAGNOSIS — D72.829: ICD-10-CM

## 2021-04-24 DIAGNOSIS — Z83.3: ICD-10-CM

## 2021-04-24 DIAGNOSIS — M41.9: ICD-10-CM

## 2021-04-24 DIAGNOSIS — Z20.822: ICD-10-CM

## 2021-04-24 DIAGNOSIS — K57.32: Primary | ICD-10-CM

## 2021-04-24 DIAGNOSIS — E11.9: ICD-10-CM

## 2021-04-24 DIAGNOSIS — F17.200: ICD-10-CM

## 2021-04-24 DIAGNOSIS — Z80.8: ICD-10-CM

## 2021-04-24 DIAGNOSIS — Z79.4: ICD-10-CM

## 2021-04-24 DIAGNOSIS — N43.3: ICD-10-CM

## 2021-04-24 DIAGNOSIS — Z91.040: ICD-10-CM

## 2021-04-24 DIAGNOSIS — H10.9: ICD-10-CM

## 2021-04-24 LAB
BUN/CREATININE RATIO: 14 (ref 0–10)
HGB BLD-MCNC: 14.7 GM/DL (ref 14–17.5)
RED BLOOD COUNT: 5.09 M/UL (ref 4.2–5.5)
WHITE BLOOD COUNT: 14.9 K/UL (ref 4.5–11)

## 2021-04-24 PROCEDURE — U0002 COVID-19 LAB TEST NON-CDC: HCPCS

## 2021-04-25 LAB
BUN/CREATININE RATIO: 13 (ref 0–10)
HGB BLD-MCNC: 13.9 GM/DL (ref 14–17.5)
RED BLOOD COUNT: 4.85 M/UL (ref 4.2–5.5)
WHITE BLOOD COUNT: 14 K/UL (ref 4.5–11)

## 2021-04-26 LAB
BUN/CREATININE RATIO: 9 (ref 0–10)
HGB BLD-MCNC: 13.6 GM/DL (ref 14–17.5)
RED BLOOD COUNT: 4.7 M/UL (ref 4.2–5.5)
WHITE BLOOD COUNT: 10.6 K/UL (ref 4.5–11)

## 2021-04-27 LAB
BUN/CREATININE RATIO: 6 (ref 0–10)
HGB BLD-MCNC: 14.5 GM/DL (ref 14–17.5)
RED BLOOD COUNT: 5.01 M/UL (ref 4.2–5.5)
WHITE BLOOD COUNT: 11.1 K/UL (ref 4.5–11)

## 2021-04-28 LAB
BUN/CREATININE RATIO: 7 (ref 0–10)
HGB BLD-MCNC: 14.8 GM/DL (ref 14–17.5)
RED BLOOD COUNT: 5.36 M/UL (ref 4.2–5.5)
WHITE BLOOD COUNT: 10.2 K/UL (ref 4.5–11)

## 2021-04-29 LAB — BUN/CREATININE RATIO: 8 (ref 0–10)

## 2021-04-30 LAB
BUN/CREATININE RATIO: 8 (ref 0–10)
HGB BLD-MCNC: 14.6 GM/DL (ref 14–17.5)
RED BLOOD COUNT: 5.03 M/UL (ref 4.2–5.5)
WHITE BLOOD COUNT: 10.3 K/UL (ref 4.5–11)

## 2021-05-01 LAB — BUN/CREATININE RATIO: 11 (ref 0–10)

## 2021-06-15 ENCOUNTER — HOSPITAL ENCOUNTER (OUTPATIENT)
Dept: HOSPITAL 79 - OR | Age: 41
Discharge: HOME | End: 2021-06-15
Attending: INTERNAL MEDICINE
Payer: COMMERCIAL

## 2021-06-15 DIAGNOSIS — G47.33: ICD-10-CM

## 2021-06-15 DIAGNOSIS — Z79.899: ICD-10-CM

## 2021-06-15 DIAGNOSIS — K57.30: Primary | ICD-10-CM

## 2021-06-15 DIAGNOSIS — K21.9: ICD-10-CM

## 2021-06-15 DIAGNOSIS — E66.01: ICD-10-CM

## 2021-06-15 DIAGNOSIS — Z88.7: ICD-10-CM

## 2021-06-15 DIAGNOSIS — Z79.84: ICD-10-CM

## 2021-06-15 DIAGNOSIS — E11.9: ICD-10-CM

## 2021-06-15 DIAGNOSIS — Z88.5: ICD-10-CM

## 2021-06-15 DIAGNOSIS — Z91.040: ICD-10-CM

## 2021-06-15 DIAGNOSIS — K56.699: ICD-10-CM

## 2021-06-15 DIAGNOSIS — F17.290: ICD-10-CM

## 2021-06-15 DIAGNOSIS — K52.9: ICD-10-CM

## 2021-06-15 DIAGNOSIS — K64.1: ICD-10-CM

## 2021-06-15 DIAGNOSIS — Z20.822: ICD-10-CM

## 2021-06-15 PROCEDURE — U0002 COVID-19 LAB TEST NON-CDC: HCPCS

## 2021-07-21 ENCOUNTER — HOSPITAL ENCOUNTER (OUTPATIENT)
Dept: HOSPITAL 79 - RAD | Age: 41
End: 2021-07-21
Attending: SPECIALIST
Payer: COMMERCIAL

## 2021-07-21 DIAGNOSIS — J43.9: Primary | ICD-10-CM

## 2021-07-21 DIAGNOSIS — R91.8: ICD-10-CM

## 2021-11-19 NOTE — ED PROVIDER NOTES
Medication(s) Requested: Methylphenidate   Last office visit: 9/7, next appt 12/13  Last refill: 10/20  Is the patient due for refill of this medication(s): Yes  PDMP review: Criteria met. Forwarded to Physician/GERONIMO for signature.        Subjective   Pt comes with concern for stroke.  Has not felt right all day.  Has developed left sided facial weakness/droop.  Also has decreased sensation in his face.  Now having headache        History provided by:  Patient  Stroke   Presenting symptoms: headaches, sensory loss and weakness    Presenting symptoms: no change in level of consciousness, no confusion, no disturbances in coordination, no language symptoms, no loss of balance and no visual change    Last known well:  Felt off all day  Timing:  Constant  Progression:  Worsening  Similar to previous episodes: no    Associated symptoms: no chest pain, no difficulty swallowing, no dizziness, no facial pain, no fall, no fever, no hearing loss, no bladder incontinence, no nausea, no neck pain, no paresthesias, no seizures, no tinnitus, no vertigo and no vomiting        Review of Systems   Constitutional: Negative for fever.   HENT: Negative for hearing loss, nosebleeds, tinnitus and trouble swallowing.    Eyes: Negative.  Negative for visual disturbance.   Respiratory: Negative.    Cardiovascular: Negative.  Negative for chest pain.   Gastrointestinal: Negative.  Negative for nausea and vomiting.   Endocrine: Negative.    Genitourinary: Negative.  Negative for bladder incontinence.   Musculoskeletal: Negative.  Negative for neck pain.   Skin: Negative.    Allergic/Immunologic: Negative.    Neurological: Positive for facial asymmetry, weakness, numbness and headaches. Negative for dizziness, vertigo, seizures, disturbances in coordination, paresthesias and loss of balance.   Hematological: Negative.    Psychiatric/Behavioral: Negative.  Negative for confusion.   All other systems reviewed and are negative.      Past Medical History:   Diagnosis Date   • GERD (gastroesophageal reflux disease)    • Hypercholesteremia        Allergies   Allergen Reactions   • Bee Venom    • Latex    • Morphine And Related    • Pneumococcal Vaccines Hives   • Ppd [Tuberculin  Purified Protein Derivative]        Past Surgical History:   Procedure Laterality Date   • CYST REMOVAL         History reviewed. No pertinent family history.    Social History     Social History   • Marital status: Single     Social History Main Topics   • Smoking status: Current Every Day Smoker     Packs/day: 2.00   • Alcohol use No   • Drug use: No     Other Topics Concern   • Not on file           Objective   Physical Exam   Constitutional: He is oriented to person, place, and time. He appears well-developed and well-nourished. No distress.   HENT:   Head: Normocephalic and atraumatic.   Nose: Nose normal.   Mouth/Throat: Oropharynx is clear and moist.   Left sided facial and edge of mouth droop/asymmetry.  Decreased sensation.  Forehead spared   Eyes: Conjunctivae and EOM are normal. Pupils are equal, round, and reactive to light. Right eye exhibits no discharge. Left eye exhibits no discharge. No scleral icterus.   Neck: Normal range of motion. Neck supple. No tracheal deviation present.   Cardiovascular: Normal rate, regular rhythm, normal heart sounds and intact distal pulses.  Exam reveals no gallop and no friction rub.    No murmur heard.  Pulmonary/Chest: Effort normal and breath sounds normal. No stridor. No respiratory distress. He has no wheezes. He has no rales.   Abdominal: Soft. He exhibits no distension and no mass. There is no tenderness. There is no guarding.   Genitourinary:   Genitourinary Comments: No CVAT   Musculoskeletal: Normal range of motion. He exhibits no deformity.   Lymphadenopathy:     He has no cervical adenopathy.   Neurological: He is alert and oriented to person, place, and time. No cranial nerve deficit. Coordination normal.   Left facial droop   Skin: Skin is warm and dry. Capillary refill takes less than 2 seconds. No pallor.   Psychiatric: He has a normal mood and affect. His behavior is normal. Judgment and thought content normal.   Nursing note and vitals  reviewed.      Procedures         ED Course  ED Course   Value Comment By Time   ECG 12 Lead 12-lead EKG performed at 0107 hrs.  Interpreted by me at 0109 hrs.  Normal sinus rhythm.  Normal EKG.  Ventricular rate 89.  CT interval 140.  QRS duration 110.  .  .  No pathologic blocks.  No acute ischemic ST segment deviation.  No pathologic T-wave changes.  No criteria for STEMI. Alfredito Gill MD 03/19 0110    D/W Dr Newton, willing to see in consult Alfredito Gill MD 03/19 0243    D/W Dr Buckley, accepting for transfer Alfredito Gill MD 03/19 0306      Patient hemodynamically stable.  Negative CT imaging and lab work.  Patient has persistent facial droop and diminished sensation.  Still has a headache.       CT Head Without Contrast Stroke Protocol   ED Interpretation   CT head without IV contrast, stroke protocol   Faxed report from [facility]   Phone call from Dr. Hood Bernstein.   Impression:   1.  No definite territorial infarction.  No intracranial hemorrhage.   2.  Incidental finding of right maxillary retention cyst.   Signed Hood Bernstein M.D.      XR Chest 1 View    (Results Pending)     Labs Reviewed   COMPREHENSIVE METABOLIC PANEL - Abnormal; Notable for the following:        Result Value    Glucose 131 (*)     All other components within normal limits   CBC WITH AUTO DIFFERENTIAL - Abnormal; Notable for the following:     WBC 13.23 (*)     Monocyte % 10.5 (*)     Neutrophils, Absolute 7.48 (*)     Lymphocytes, Absolute 4.04 (*)     Monocytes, Absolute 1.39 (*)     Immature Grans, Absolute 0.07 (*)     All other components within normal limits   PROTIME-INR - Normal    Narrative:     Suggested INR therapeutic range for stable oral anticoagulant therapy:    Low Intensity therapy:   1.5-2.0  Moderate Intensity therapy:   2.0-3.0  High Intensity therapy:   2.5-4.0   APTT - Normal    Narrative:     PTT Heparin Therapeutic Range:  59 - 95 seconds   URINALYSIS W/ CULTURE IF  INDICATED - Normal    Narrative:     Urine microscopic not indicated.   CK - Normal   CK MB - Normal   TROPONIN (IN-HOUSE) - Normal    Narrative:     Ultra Troponin I Reference Range:         <=0.039 ng/mL: Negative    0.04-0.779 ng/mL: Indeterminate Range. Suspicious of MI.  Clinical correlation required.       >=0.78  ng/mL: Consistent with myocardial injury.  Clinical correlation required.   OSMOLALITY, CALCULATED - Normal   CKMB INDEX CALCULATION - Normal   CBC AND DIFFERENTIAL    Narrative:     The following orders were created for panel order CBC & Differential.  Procedure                               Abnormality         Status                     ---------                               -----------         ------                     CBC Auto Differential[[id number]]        Abnormal            Final result                 Please view results for these tests on the individual orders.        Medication List      ASK your doctor about these medications    aspirin 81 MG chewable tablet     baclofen 10 MG tablet  Commonly known as:  LIORESAL     EPINEPHrine 0.3 MG/0.3ML solution auto-injector injection  Commonly known as:  EPIPEN 2-AMANDA  Inject 0.3 mL into the shoulder, thigh, or buttocks 1 (one) time for 1   dose.  Ask about: Should I take this medication?     omeprazole 20 MG capsule  Commonly known as:  priLOSEC                    MDM  Number of Diagnoses or Management Options  Cerebrovascular accident (CVA) due to occlusion of other cerebral artery: new and requires workup     Amount and/or Complexity of Data Reviewed  Clinical lab tests: ordered and reviewed  Tests in the radiology section of CPT®: ordered and reviewed  Discuss the patient with other providers: yes  Independent visualization of images, tracings, or specimens: yes    Risk of Complications, Morbidity, and/or Mortality  Presenting problems: high  Diagnostic procedures: high  Management options: high    Patient Progress  Patient progress:  (Fair)      Final diagnoses:   Cerebrovascular accident (CVA) due to occlusion of other cerebral artery            Alfredito Gill MD  03/19/18 5397

## 2021-12-01 PROCEDURE — 93010 ELECTROCARDIOGRAM REPORT: CPT | Performed by: INTERNAL MEDICINE

## 2021-12-01 PROCEDURE — 96365 THER/PROPH/DIAG IV INF INIT: CPT

## 2021-12-01 PROCEDURE — 93005 ELECTROCARDIOGRAM TRACING: CPT | Performed by: EMERGENCY MEDICINE

## 2021-12-01 PROCEDURE — 96375 TX/PRO/DX INJ NEW DRUG ADDON: CPT

## 2021-12-01 PROCEDURE — 96367 TX/PROPH/DG ADDL SEQ IV INF: CPT

## 2021-12-01 PROCEDURE — 93005 ELECTROCARDIOGRAM TRACING: CPT

## 2021-12-01 PROCEDURE — 99284 EMERGENCY DEPT VISIT MOD MDM: CPT

## 2021-12-02 ENCOUNTER — HOSPITAL ENCOUNTER (EMERGENCY)
Facility: HOSPITAL | Age: 41
Discharge: HOME | End: 2021-12-02
Attending: EMERGENCY MEDICINE | Admitting: EMERGENCY MEDICINE

## 2021-12-02 ENCOUNTER — APPOINTMENT (OUTPATIENT)
Dept: CT IMAGING | Facility: HOSPITAL | Age: 41
End: 2021-12-02

## 2021-12-02 ENCOUNTER — APPOINTMENT (OUTPATIENT)
Dept: GENERAL RADIOLOGY | Facility: HOSPITAL | Age: 41
End: 2021-12-02

## 2021-12-02 VITALS
DIASTOLIC BLOOD PRESSURE: 77 MMHG | HEIGHT: 68 IN | SYSTOLIC BLOOD PRESSURE: 115 MMHG | WEIGHT: 280 LBS | TEMPERATURE: 98 F | RESPIRATION RATE: 18 BRPM | BODY MASS INDEX: 42.44 KG/M2 | HEART RATE: 98 BPM | OXYGEN SATURATION: 97 %

## 2021-12-02 DIAGNOSIS — J18.9 PNEUMONITIS: Primary | ICD-10-CM

## 2021-12-02 LAB
ALBUMIN SERPL-MCNC: 4 G/DL (ref 3.5–5.2)
ALBUMIN/GLOB SERPL: 1.3 G/DL
ALP SERPL-CCNC: 74 U/L (ref 39–117)
ALT SERPL W P-5'-P-CCNC: 16 U/L (ref 1–41)
ANION GAP SERPL CALCULATED.3IONS-SCNC: 12.4 MMOL/L (ref 5–15)
AST SERPL-CCNC: 11 U/L (ref 1–40)
B PARAPERT DNA SPEC QL NAA+PROBE: NOT DETECTED
B PERT DNA SPEC QL NAA+PROBE: NOT DETECTED
BASOPHILS # BLD AUTO: 0.06 10*3/MM3 (ref 0–0.2)
BASOPHILS NFR BLD AUTO: 0.3 % (ref 0–1.5)
BILIRUB SERPL-MCNC: 0.3 MG/DL (ref 0–1.2)
BILIRUB UR QL STRIP: NEGATIVE
BUN SERPL-MCNC: 12 MG/DL (ref 6–20)
BUN/CREAT SERPL: 17.4 (ref 7–25)
C PNEUM DNA NPH QL NAA+NON-PROBE: NOT DETECTED
CALCIUM SPEC-SCNC: 9.2 MG/DL (ref 8.6–10.5)
CHLORIDE SERPL-SCNC: 106 MMOL/L (ref 98–107)
CLARITY UR: CLEAR
CO2 SERPL-SCNC: 25.6 MMOL/L (ref 22–29)
COLOR UR: YELLOW
CREAT SERPL-MCNC: 0.69 MG/DL (ref 0.76–1.27)
CRP SERPL-MCNC: 2.82 MG/DL (ref 0–0.5)
D DIMER PPP FEU-MCNC: 0.31 MCGFEU/ML (ref 0–0.5)
DEPRECATED RDW RBC AUTO: 47.8 FL (ref 37–54)
EOSINOPHIL # BLD AUTO: 0.22 10*3/MM3 (ref 0–0.4)
EOSINOPHIL NFR BLD AUTO: 1.2 % (ref 0.3–6.2)
ERYTHROCYTE [DISTWIDTH] IN BLOOD BY AUTOMATED COUNT: 14.7 % (ref 12.3–15.4)
ERYTHROCYTE [SEDIMENTATION RATE] IN BLOOD: 23 MM/HR (ref 0–15)
FLUAV SUBTYP SPEC NAA+PROBE: NOT DETECTED
FLUBV RNA ISLT QL NAA+PROBE: NOT DETECTED
GFR SERPL CREATININE-BSD FRML MDRD: 126 ML/MIN/1.73
GLOBULIN UR ELPH-MCNC: 3 GM/DL
GLUCOSE SERPL-MCNC: 130 MG/DL (ref 65–99)
GLUCOSE UR STRIP-MCNC: NEGATIVE MG/DL
HADV DNA SPEC NAA+PROBE: NOT DETECTED
HCOV 229E RNA SPEC QL NAA+PROBE: NOT DETECTED
HCOV HKU1 RNA SPEC QL NAA+PROBE: NOT DETECTED
HCOV NL63 RNA SPEC QL NAA+PROBE: NOT DETECTED
HCOV OC43 RNA SPEC QL NAA+PROBE: NOT DETECTED
HCT VFR BLD AUTO: 48.2 % (ref 37.5–51)
HGB BLD-MCNC: 15.2 G/DL (ref 13–17.7)
HGB UR QL STRIP.AUTO: NEGATIVE
HMPV RNA NPH QL NAA+NON-PROBE: NOT DETECTED
HOLD SPECIMEN: NORMAL
HOLD SPECIMEN: NORMAL
HPIV1 RNA ISLT QL NAA+PROBE: NOT DETECTED
HPIV2 RNA SPEC QL NAA+PROBE: NOT DETECTED
HPIV3 RNA NPH QL NAA+PROBE: NOT DETECTED
HPIV4 P GENE NPH QL NAA+PROBE: NOT DETECTED
IMM GRANULOCYTES # BLD AUTO: 0.1 10*3/MM3 (ref 0–0.05)
IMM GRANULOCYTES NFR BLD AUTO: 0.5 % (ref 0–0.5)
KETONES UR QL STRIP: NEGATIVE
LEUKOCYTE ESTERASE UR QL STRIP.AUTO: NEGATIVE
LYMPHOCYTES # BLD AUTO: 3.18 10*3/MM3 (ref 0.7–3.1)
LYMPHOCYTES NFR BLD AUTO: 16.7 % (ref 19.6–45.3)
M PNEUMO IGG SER IA-ACNC: NOT DETECTED
MAGNESIUM SERPL-MCNC: 2 MG/DL (ref 1.6–2.6)
MCH RBC QN AUTO: 27.8 PG (ref 26.6–33)
MCHC RBC AUTO-ENTMCNC: 31.5 G/DL (ref 31.5–35.7)
MCV RBC AUTO: 88.3 FL (ref 79–97)
MONOCYTES # BLD AUTO: 1.6 10*3/MM3 (ref 0.1–0.9)
MONOCYTES NFR BLD AUTO: 8.4 % (ref 5–12)
NEUTROPHILS NFR BLD AUTO: 13.9 10*3/MM3 (ref 1.7–7)
NEUTROPHILS NFR BLD AUTO: 72.9 % (ref 42.7–76)
NITRITE UR QL STRIP: NEGATIVE
NRBC BLD AUTO-RTO: 0 /100 WBC (ref 0–0.2)
NT-PROBNP SERPL-MCNC: 7.9 PG/ML (ref 0–450)
PH UR STRIP.AUTO: 6 [PH] (ref 5–8)
PLATELET # BLD AUTO: 304 10*3/MM3 (ref 140–450)
PMV BLD AUTO: 9.3 FL (ref 6–12)
POTASSIUM SERPL-SCNC: 3.9 MMOL/L (ref 3.5–5.2)
PROT SERPL-MCNC: 7 G/DL (ref 6–8.5)
PROT UR QL STRIP: NEGATIVE
RBC # BLD AUTO: 5.46 10*6/MM3 (ref 4.14–5.8)
RHINOVIRUS RNA SPEC NAA+PROBE: NOT DETECTED
RSV RNA NPH QL NAA+NON-PROBE: NOT DETECTED
SARS-COV-2 RNA NPH QL NAA+NON-PROBE: NOT DETECTED
SODIUM SERPL-SCNC: 144 MMOL/L (ref 136–145)
SP GR UR STRIP: 1.01 (ref 1–1.03)
T4 FREE SERPL-MCNC: 1.24 NG/DL (ref 0.93–1.7)
TROPONIN T SERPL-MCNC: <0.01 NG/ML (ref 0–0.03)
TROPONIN T SERPL-MCNC: <0.01 NG/ML (ref 0–0.03)
TSH SERPL DL<=0.05 MIU/L-ACNC: 1.66 UIU/ML (ref 0.27–4.2)
UROBILINOGEN UR QL STRIP: NORMAL
WBC NRBC COR # BLD: 19.06 10*3/MM3 (ref 3.4–10.8)
WHOLE BLOOD HOLD SPECIMEN: NORMAL
WHOLE BLOOD HOLD SPECIMEN: NORMAL

## 2021-12-02 PROCEDURE — 85652 RBC SED RATE AUTOMATED: CPT | Performed by: EMERGENCY MEDICINE

## 2021-12-02 PROCEDURE — 71275 CT ANGIOGRAPHY CHEST: CPT

## 2021-12-02 PROCEDURE — 81003 URINALYSIS AUTO W/O SCOPE: CPT | Performed by: EMERGENCY MEDICINE

## 2021-12-02 PROCEDURE — 94640 AIRWAY INHALATION TREATMENT: CPT

## 2021-12-02 PROCEDURE — 25010000002 CEFTRIAXONE PER 250 MG: Performed by: EMERGENCY MEDICINE

## 2021-12-02 PROCEDURE — 86140 C-REACTIVE PROTEIN: CPT | Performed by: EMERGENCY MEDICINE

## 2021-12-02 PROCEDURE — 0202U NFCT DS 22 TRGT SARS-COV-2: CPT | Performed by: EMERGENCY MEDICINE

## 2021-12-02 PROCEDURE — 85025 COMPLETE CBC W/AUTO DIFF WBC: CPT | Performed by: EMERGENCY MEDICINE

## 2021-12-02 PROCEDURE — 71045 X-RAY EXAM CHEST 1 VIEW: CPT

## 2021-12-02 PROCEDURE — 83735 ASSAY OF MAGNESIUM: CPT | Performed by: EMERGENCY MEDICINE

## 2021-12-02 PROCEDURE — 94799 UNLISTED PULMONARY SVC/PX: CPT

## 2021-12-02 PROCEDURE — 25010000002 ONDANSETRON PER 1 MG: Performed by: EMERGENCY MEDICINE

## 2021-12-02 PROCEDURE — 84484 ASSAY OF TROPONIN QUANT: CPT | Performed by: EMERGENCY MEDICINE

## 2021-12-02 PROCEDURE — 80053 COMPREHEN METABOLIC PANEL: CPT | Performed by: EMERGENCY MEDICINE

## 2021-12-02 PROCEDURE — 74177 CT ABD & PELVIS W/CONTRAST: CPT

## 2021-12-02 PROCEDURE — 0 IOPAMIDOL PER 1 ML: Performed by: EMERGENCY MEDICINE

## 2021-12-02 PROCEDURE — 93005 ELECTROCARDIOGRAM TRACING: CPT | Performed by: EMERGENCY MEDICINE

## 2021-12-02 PROCEDURE — 85379 FIBRIN DEGRADATION QUANT: CPT | Performed by: EMERGENCY MEDICINE

## 2021-12-02 PROCEDURE — 25010000002 METHYLPREDNISOLONE PER 125 MG: Performed by: EMERGENCY MEDICINE

## 2021-12-02 PROCEDURE — 84443 ASSAY THYROID STIM HORMONE: CPT | Performed by: EMERGENCY MEDICINE

## 2021-12-02 PROCEDURE — 84439 ASSAY OF FREE THYROXINE: CPT | Performed by: EMERGENCY MEDICINE

## 2021-12-02 PROCEDURE — 83880 ASSAY OF NATRIURETIC PEPTIDE: CPT | Performed by: EMERGENCY MEDICINE

## 2021-12-02 RX ORDER — SODIUM CHLORIDE 0.9 % (FLUSH) 0.9 %
10 SYRINGE (ML) INJECTION AS NEEDED
Status: DISCONTINUED | OUTPATIENT
Start: 2021-12-02 | End: 2021-12-02 | Stop reason: HOSPADM

## 2021-12-02 RX ORDER — METHYLPREDNISOLONE SODIUM SUCCINATE 125 MG/2ML
125 INJECTION, POWDER, LYOPHILIZED, FOR SOLUTION INTRAMUSCULAR; INTRAVENOUS ONCE
Status: COMPLETED | OUTPATIENT
Start: 2021-12-02 | End: 2021-12-02

## 2021-12-02 RX ORDER — ONDANSETRON 2 MG/ML
4 INJECTION INTRAMUSCULAR; INTRAVENOUS ONCE
Status: COMPLETED | OUTPATIENT
Start: 2021-12-02 | End: 2021-12-02

## 2021-12-02 RX ORDER — IPRATROPIUM BROMIDE AND ALBUTEROL SULFATE 2.5; .5 MG/3ML; MG/3ML
3 SOLUTION RESPIRATORY (INHALATION) ONCE
Status: COMPLETED | OUTPATIENT
Start: 2021-12-02 | End: 2021-12-02

## 2021-12-02 RX ORDER — ALBUTEROL SULFATE 90 UG/1
2 AEROSOL, METERED RESPIRATORY (INHALATION) EVERY 6 HOURS PRN
Qty: 18 G | Refills: 0 | Status: SHIPPED | OUTPATIENT
Start: 2021-12-02

## 2021-12-02 RX ORDER — ASPIRIN 81 MG/1
324 TABLET, CHEWABLE ORAL ONCE
Status: COMPLETED | OUTPATIENT
Start: 2021-12-02 | End: 2021-12-02

## 2021-12-02 RX ORDER — DOXYCYCLINE 100 MG/1
100 CAPSULE ORAL 2 TIMES DAILY
Qty: 20 CAPSULE | Refills: 0 | Status: SHIPPED | OUTPATIENT
Start: 2021-12-02

## 2021-12-02 RX ORDER — PREDNISONE 20 MG/1
20 TABLET ORAL
Qty: 15 TABLET | Refills: 0 | OUTPATIENT
Start: 2021-12-02 | End: 2022-09-03

## 2021-12-02 RX ADMIN — DOXYCYCLINE 100 MG: 100 INJECTION, POWDER, LYOPHILIZED, FOR SOLUTION INTRAVENOUS at 03:38

## 2021-12-02 RX ADMIN — NITROGLYCERIN 1 INCH: 20 OINTMENT TOPICAL at 00:20

## 2021-12-02 RX ADMIN — CEFTRIAXONE 1 G: 1 INJECTION, POWDER, FOR SOLUTION INTRAMUSCULAR; INTRAVENOUS at 02:31

## 2021-12-02 RX ADMIN — METHYLPREDNISOLONE SODIUM SUCCINATE 125 MG: 125 INJECTION, POWDER, FOR SOLUTION INTRAMUSCULAR; INTRAVENOUS at 02:31

## 2021-12-02 RX ADMIN — ONDANSETRON 4 MG: 2 INJECTION INTRAMUSCULAR; INTRAVENOUS at 00:20

## 2021-12-02 RX ADMIN — IOPAMIDOL 80 ML: 755 INJECTION, SOLUTION INTRAVENOUS at 01:14

## 2021-12-02 RX ADMIN — IPRATROPIUM BROMIDE AND ALBUTEROL SULFATE 3 ML: .5; 3 SOLUTION RESPIRATORY (INHALATION) at 02:07

## 2021-12-02 RX ADMIN — SODIUM CHLORIDE 1000 ML: 9 INJECTION, SOLUTION INTRAVENOUS at 00:20

## 2021-12-02 RX ADMIN — ASPIRIN 324 MG: 81 TABLET, CHEWABLE ORAL at 00:21

## 2021-12-02 NOTE — ED NOTES
MEDICAL SCREENING:    Reason for Visit: Chest Pain    Patient initially seen in triage.  The patient was advised further evaluation and diagnostic testing will be needed, some of the treatment and testing will be initiated in the lobby in order to begin the process.  The patient will be returned to the waiting area for the time being and possibly be re-assessed by a subsequent ED provider.  The patient will be brought back to the treatment area in as timely manner as possible.       Josue Roth MD  12/02/21 0002

## 2021-12-03 ENCOUNTER — TRANSCRIBE ORDERS (OUTPATIENT)
Dept: ADMINISTRATIVE | Facility: HOSPITAL | Age: 41
End: 2021-12-03

## 2021-12-03 DIAGNOSIS — R07.9 CHEST PAIN, UNSPECIFIED TYPE: Primary | ICD-10-CM

## 2021-12-03 LAB
QT INTERVAL: 332 MS
QTC INTERVAL: 463 MS

## 2021-12-06 NOTE — ED PROVIDER NOTES
Subjective     History provided by:  Patient   used: No    Chest Pain  Pain location:  Substernal area  Pain quality: aching, dull, throbbing and tightness    Pain radiates to:  Does not radiate  Pain severity:  Mild  Onset quality:  Gradual  Timing:  Constant  Progression:  Worsening  Chronicity:  New  Context: not breathing, not drug use, not eating, not intercourse, not lifting, not movement, not raising an arm, not at rest, not stress and not trauma    Relieved by:  Nothing  Worsened by:  Nothing  Ineffective treatments:  None tried  Associated symptoms: no abdominal pain, no AICD problem, no altered mental status, no anorexia, no anxiety, no back pain, no claudication, no cough, no diaphoresis, no dizziness, no dysphagia, no fatigue, no fever, no headache, no heartburn, no lower extremity edema, no nausea, no near-syncope, no numbness, no orthopnea, no palpitations, no PND, no shortness of breath, no syncope, no vomiting and no weakness    Risk factors: no aortic disease, no birth control, no coronary artery disease, no diabetes mellitus, no Beverley-Danlos syndrome, no high cholesterol, no hypertension, no immobilization, not male, no Marfan's syndrome, not obese, not pregnant, no prior DVT/PE, no smoking and no surgery        Review of Systems   Constitutional: Negative for activity change, appetite change, chills, diaphoresis, fatigue and fever.   HENT: Negative for congestion, ear pain, sore throat and trouble swallowing.    Eyes: Negative for redness.   Respiratory: Negative for cough, chest tightness, shortness of breath and wheezing.    Cardiovascular: Positive for chest pain. Negative for palpitations, orthopnea, claudication, leg swelling, syncope, PND and near-syncope.   Gastrointestinal: Negative for abdominal pain, anorexia, diarrhea, heartburn, nausea and vomiting.   Genitourinary: Negative for dysuria and urgency.   Musculoskeletal: Negative for arthralgias, back pain, myalgias  and neck pain.   Skin: Negative for pallor, rash and wound.   Neurological: Negative for dizziness, speech difficulty, weakness, numbness and headaches.   Psychiatric/Behavioral: Negative for agitation, behavioral problems, confusion and decreased concentration.   All other systems reviewed and are negative.      Past Medical History:   Diagnosis Date   • GERD (gastroesophageal reflux disease)    • Hypercholesteremia        Allergies   Allergen Reactions   • Bee Venom    • Latex    • Morphine And Related    • Pneumococcal Vaccines Hives   • Ppd [Tuberculin Purified Protein Derivative]        Past Surgical History:   Procedure Laterality Date   • CYST REMOVAL         Family History   Problem Relation Age of Onset   • Arthritis Mother    • Diabetes Father    • Heart disease Father    • Hypertension Father    • Arthritis Father    • No Known Problems Brother        Social History     Socioeconomic History   • Marital status: Single   Tobacco Use   • Smoking status: Current Every Day Smoker     Packs/day: 2.00     Types: Cigarettes   • Smokeless tobacco: Current User     Types: Chew   Substance and Sexual Activity   • Alcohol use: No   • Drug use: No   • Sexual activity: Defer           Objective   Physical Exam  Vitals and nursing note reviewed.   Constitutional:       General: He is not in acute distress.     Appearance: Normal appearance. He is well-developed. He is not toxic-appearing or diaphoretic.   HENT:      Head: Normocephalic and atraumatic.      Right Ear: External ear normal.      Left Ear: External ear normal.      Nose: Nose normal.      Mouth/Throat:      Pharynx: No oropharyngeal exudate.      Tonsils: No tonsillar exudate.   Eyes:      General: Lids are normal.      Conjunctiva/sclera: Conjunctivae normal.      Pupils: Pupils are equal, round, and reactive to light.   Neck:      Thyroid: No thyromegaly.   Cardiovascular:      Rate and Rhythm: Normal rate and regular rhythm.      Pulses: Normal pulses.       Heart sounds: Normal heart sounds, S1 normal and S2 normal.   Pulmonary:      Effort: Pulmonary effort is normal. No tachypnea or respiratory distress.      Breath sounds: Normal breath sounds. No decreased breath sounds, wheezing or rales.   Chest:      Chest wall: No tenderness.   Abdominal:      General: Bowel sounds are normal. There is no distension.      Palpations: Abdomen is soft.      Tenderness: There is no abdominal tenderness. There is no guarding or rebound.   Musculoskeletal:         General: No tenderness or deformity. Normal range of motion.      Cervical back: Full passive range of motion without pain, normal range of motion and neck supple.   Lymphadenopathy:      Cervical: No cervical adenopathy.   Skin:     General: Skin is warm and dry.      Coloration: Skin is not pale.      Findings: No erythema or rash.   Neurological:      Mental Status: He is alert and oriented to person, place, and time.      GCS: GCS eye subscore is 4. GCS verbal subscore is 5. GCS motor subscore is 6.      Cranial Nerves: No cranial nerve deficit.      Sensory: No sensory deficit.   Psychiatric:         Speech: Speech normal.         Behavior: Behavior normal.         Thought Content: Thought content normal.         Judgment: Judgment normal.         Procedures           ED Course  ED Course as of 12/06/21 0157   Wed Dec 01, 2021   2343 EKG at 2341 hrs. sinus tachycardia 117 bpm, , , QTc 463, regular axis, no significant ST deviation concerning for acute ischemia. [KP]   u Dec 02, 2021   0156 XR Chest 1 View  IMPRESSION:  No acute cardiopulmonary findings. [ES]   0156 CT Chest Pulmonary Embolism  IMPRESSION:     1. No PE or aortic dissection.  2. Mild mediastinal adenopathy is nonspecific. Attention on 3 month follow-up chest CT is recommended.  3. Groundglass opacities in the lungs concerning for mild multifocal pneumonitis although an atypical presentation of edema could potentially present in a  similar fashion. [ES]   0157 CT Abdomen Pelvis With Contrast  IMPRESSION:     1. No clearly acute findings in the abdomen or pelvis.  2. Colonic diverticulosis without diverticulitis or colitis. Normal small bowel and appendix.  3. Hepatic steatosis.  4. Additional findings as above. [ES]   Mon Dec 06, 2021   0156 ECG 12 Lead  Vent. Rate : 117 BPM     Atrial Rate : 117 BPM     P-R Int : 140 ms          QRS Dur : 108 ms      QT Int : 332 ms       P-R-T Axes :  53  62  38 degrees     QTc Int : 463 ms     Sinus tachycardia  Otherwise normal ECG  When compared with ECG of 01-DEC-2021 23:40, (Unconfirmed)  Left posterior fascicular block is no longer present [ES]   0156 Heart Score: 1 [ES]      ED Course User Index  [ES] Josue Roth MD  [KP] Willy Wilson MD                                                 MDM  Number of Diagnoses or Management Options  Pneumonitis: new and requires workup     Amount and/or Complexity of Data Reviewed  Clinical lab tests: reviewed and ordered  Tests in the radiology section of CPT®: reviewed and ordered  Tests in the medicine section of CPT®: reviewed and ordered  Review and summarize past medical records: yes  Independent visualization of images, tracings, or specimens: yes    Risk of Complications, Morbidity, and/or Mortality  Presenting problems: moderate  Diagnostic procedures: moderate  Management options: moderate    Patient Progress  Patient progress: stable      Final diagnoses:   Pneumonitis       ED Disposition  ED Disposition     ED Disposition Condition Comment    Discharge Stable           Curtis Schaeffer MD  [address] [place] [id number]  [phone]    Schedule an appointment as soon as possible for a visit in 1 day  EVALUATE         Medication List      New Prescriptions    albuterol sulfate  (90 Base) MCG/ACT inhaler  Commonly known as: PROVENTIL HFA;VENTOLIN HFA;PROAIR HFA  Inhale 2 puffs Every 6 (Six) Hours As Needed for  Wheezing.     doxycycline 100 MG capsule  Commonly known as: MONODOX  Take 1 capsule by mouth 2 (Two) Times a Day.     predniSONE 20 MG tablet  Commonly known as: DELTASONE  Take 1 tablet by mouth 3 (Three) Times a Day With Meals.           Where to Get Your Medications      These medications were sent to [facility]/pharmacy #[id number] - [place], [place] - [address] [facility] [address] - [phone]  - [phone]   [address] [place] [address]    Phone: [phone]   · albuterol sulfate  (90 Base) MCG/ACT inhaler  · doxycycline 100 MG capsule  · predniSONE 20 MG tablet          Josue Roth MD  12/06/21 0156

## 2021-12-07 ENCOUNTER — HOSPITAL ENCOUNTER (OUTPATIENT)
Dept: NUCLEAR MEDICINE | Facility: HOSPITAL | Age: 41
Discharge: HOME | End: 2021-12-07

## 2021-12-07 ENCOUNTER — HOSPITAL ENCOUNTER (OUTPATIENT)
Dept: CARDIOLOGY | Facility: HOSPITAL | Age: 41
Discharge: HOME | End: 2021-12-07

## 2021-12-07 DIAGNOSIS — R07.9 CHEST PAIN, UNSPECIFIED TYPE: ICD-10-CM

## 2021-12-07 LAB
BH CV REST NUCLEAR ISOTOPE DOSE: 10.1 MCI
BH CV STRESS BP STAGE 1: NORMAL
BH CV STRESS BP STAGE 2: NORMAL
BH CV STRESS COMMENTS STAGE 1: NORMAL
BH CV STRESS COMMENTS STAGE 2: NORMAL
BH CV STRESS DOSE REGADENOSON STAGE 1: 0.4
BH CV STRESS DURATION MIN STAGE 1: 0
BH CV STRESS DURATION MIN STAGE 2: 4
BH CV STRESS DURATION SEC STAGE 1: 10
BH CV STRESS DURATION SEC STAGE 2: 0
BH CV STRESS HR STAGE 1: 116
BH CV STRESS HR STAGE 2: 96
BH CV STRESS NUCLEAR ISOTOPE DOSE: 30.5 MCI
BH CV STRESS PROTOCOL 1: NORMAL
BH CV STRESS RECOVERY BP: NORMAL MMHG
BH CV STRESS RECOVERY HR: 96 BPM
BH CV STRESS STAGE 1: 1
BH CV STRESS STAGE 2: 2
LV EF NUC BP: 45 %
MAXIMAL PREDICTED HEART RATE: 178 BPM
PERCENT MAX PREDICTED HR: 65.17 %
STRESS BASELINE BP: NORMAL MMHG
STRESS BASELINE HR: 95 BPM
STRESS PERCENT HR: 77 %
STRESS POST PEAK BP: NORMAL MMHG
STRESS POST PEAK HR: 116 BPM
STRESS TARGET HR: 151 BPM

## 2021-12-07 PROCEDURE — A9500 TC99M SESTAMIBI: HCPCS | Performed by: EMERGENCY MEDICINE

## 2021-12-07 PROCEDURE — 93017 CV STRESS TEST TRACING ONLY: CPT

## 2021-12-07 PROCEDURE — 78452 HT MUSCLE IMAGE SPECT MULT: CPT | Performed by: SPECIALIST

## 2021-12-07 PROCEDURE — 0 TECHNETIUM SESTAMIBI: Performed by: EMERGENCY MEDICINE

## 2021-12-07 PROCEDURE — 25010000002 REGADENOSON 0.4 MG/5ML SOLUTION: Performed by: SPECIALIST

## 2021-12-07 PROCEDURE — 78452 HT MUSCLE IMAGE SPECT MULT: CPT

## 2021-12-07 PROCEDURE — 93018 CV STRESS TEST I&R ONLY: CPT | Performed by: SPECIALIST

## 2021-12-07 RX ADMIN — REGADENOSON 0.4 MG: 0.08 INJECTION, SOLUTION INTRAVENOUS at 09:46

## 2021-12-07 RX ADMIN — TECHNETIUM TC 99M SESTAMIBI 1 DOSE: 1 INJECTION INTRAVENOUS at 09:47

## 2021-12-07 RX ADMIN — TECHNETIUM TC 99M SESTAMIBI 1 DOSE: 1 INJECTION INTRAVENOUS at 08:24

## 2022-09-03 ENCOUNTER — APPOINTMENT (OUTPATIENT)
Dept: CT IMAGING | Facility: HOSPITAL | Age: 42
End: 2022-09-03

## 2022-09-03 ENCOUNTER — HOSPITAL ENCOUNTER (EMERGENCY)
Facility: HOSPITAL | Age: 42
Discharge: HOME | End: 2022-09-03
Attending: STUDENT IN AN ORGANIZED HEALTH CARE EDUCATION/TRAINING PROGRAM | Admitting: STUDENT IN AN ORGANIZED HEALTH CARE EDUCATION/TRAINING PROGRAM

## 2022-09-03 VITALS
HEIGHT: 69 IN | TEMPERATURE: 97.5 F | BODY MASS INDEX: 42.36 KG/M2 | OXYGEN SATURATION: 97 % | HEART RATE: 93 BPM | WEIGHT: 286 LBS | SYSTOLIC BLOOD PRESSURE: 153 MMHG | DIASTOLIC BLOOD PRESSURE: 96 MMHG | RESPIRATION RATE: 16 BRPM

## 2022-09-03 DIAGNOSIS — G51.0 BELL'S PALSY: Primary | ICD-10-CM

## 2022-09-03 PROCEDURE — 99283 EMERGENCY DEPT VISIT LOW MDM: CPT

## 2022-09-03 PROCEDURE — 70450 CT HEAD/BRAIN W/O DYE: CPT

## 2022-09-03 PROCEDURE — 63710000001 PREDNISONE PER 1 MG: Performed by: STUDENT IN AN ORGANIZED HEALTH CARE EDUCATION/TRAINING PROGRAM

## 2022-09-03 RX ORDER — PREDNISONE 20 MG/1
80 TABLET ORAL ONCE
Status: COMPLETED | OUTPATIENT
Start: 2022-09-03 | End: 2022-09-03

## 2022-09-03 RX ORDER — VALACYCLOVIR HYDROCHLORIDE 500 MG/1
1000 TABLET, FILM COATED ORAL ONCE
Status: COMPLETED | OUTPATIENT
Start: 2022-09-03 | End: 2022-09-03

## 2022-09-03 RX ORDER — VALACYCLOVIR HYDROCHLORIDE 1 G/1
1000 TABLET, FILM COATED ORAL 3 TIMES DAILY
Qty: 21 TABLET | Refills: 0 | Status: SHIPPED | OUTPATIENT
Start: 2022-09-03 | End: 2022-09-10

## 2022-09-03 RX ORDER — PREDNISONE 20 MG/1
80 TABLET ORAL DAILY
Qty: 28 TABLET | Refills: 0 | Status: SHIPPED | OUTPATIENT
Start: 2022-09-03 | End: 2022-09-10

## 2022-09-03 RX ADMIN — VALACYCLOVIR HYDROCHLORIDE 1000 MG: 500 TABLET, FILM COATED ORAL at 17:25

## 2022-09-03 RX ADMIN — PREDNISONE 80 MG: 20 TABLET ORAL at 17:24

## 2022-09-03 NOTE — DISCHARGE INSTRUCTIONS
Please follow-up with your primary care provider.  He denies a date to return here for new onset or worsening symptoms.

## 2022-09-03 NOTE — ED NOTES
Dr. Wilson notified of failure of dysphagia screen r/t facial asymmetry. Per murali Frieday to give patient oral medications prior to discharge.

## 2022-09-03 NOTE — ED PROVIDER NOTES
[facility]  emergency department encounter        Pt Name: Huseyin Ernandez  MRN: [id number]  Birthdate 1980  Date of evaluation: 9/3/2022    Chief Complaint   Patient presents with   • Facial Droop             HISTORY OF PRESENT ILLNESS:   Huseyin Ernandez is a 42 y.o. male PMH HLD, DM, CVA patient presents for right-sided facial droop.  New onset yesterday at 2 PM.  Patient reports symptoms worsened today.  Endorses mild dysarthria secondary to the weakness of the left side of his face.  Endorses headache. Broadly denies symptoms otherwise including numbness, any other weakness, vision changes.  No ear complaints.      No other exacerbating or alleviating factors other than as noted above.  Severity: Severe    PCP: Romain Koehler MD          REVIEW OF SYSTEMS:     Review of Systems   Constitutional: Negative for fever.   HENT: Negative for congestion and rhinorrhea.    Eyes: Negative for visual disturbance.   Respiratory: Negative for cough and shortness of breath.    Cardiovascular: Negative for chest pain.   Gastrointestinal: Negative for abdominal pain, nausea and vomiting.   Genitourinary: Negative for dysuria.   Musculoskeletal: Negative for myalgias.   Skin: Negative for rash.   Neurological: Positive for facial asymmetry and speech difficulty. Negative for headaches.   Psychiatric/Behavioral: Negative for confusion.       Review of systems otherwise as per HPI.          PREVIOUS HISTORY:         Past Medical History:   Diagnosis Date   • GERD (gastroesophageal reflux disease)    • Hypercholesteremia            Past Surgical History:   Procedure Laterality Date   • CYST REMOVAL             Social History     Socioeconomic History   • Marital status: Single   Tobacco Use   • Smoking status: Current Every Day Smoker     Packs/day: 2.00     Types: Cigarettes   • Smokeless tobacco: Current User     Types: Chew   Substance and Sexual Activity   • Alcohol use: No   • Drug use: No   • Sexual activity:  Defer           Family History   Problem Relation Age of Onset   • Arthritis Mother    • Diabetes Father    • Heart disease Father    • Hypertension Father    • Arthritis Father    • No Known Problems Brother          Current Outpatient Medications   Medication Instructions   • albuterol sulfate  (90 Base) MCG/ACT inhaler 2 puffs, Inhalation, Every 6 Hours PRN   • aspirin 81 mg, Oral, Daily   • baclofen (LIORESAL) 10 mg, Oral, Daily   • celecoxib (CELEBREX) 200 mg, Oral, Daily   • doxycycline (MONODOX) 100 mg, Oral, 2 Times Daily   • omeprazole (PRILOSEC) 20 mg, Oral, Daily   • predniSONE (DELTASONE) 80 mg, Oral, Daily   • valACYclovir (VALTREX) 1,000 mg, Oral, 3 Times Daily         Allergies:  Bee venom, Latex, Morphine and related, Pneumococcal vaccines, and Ppd [tuberculin purified protein derivative]    Medications, allergies and past medical, surgical, family, and social history reviewed.        PHYSICAL EXAM:     Physical Exam  Vitals and nursing note reviewed.   Constitutional:       General: He is not in acute distress.     Appearance: Normal appearance.   HENT:      Head: Normocephalic and atraumatic.   Eyes:      Extraocular Movements: Extraocular movements intact.      Conjunctiva/sclera: Conjunctivae normal.   Cardiovascular:      Rate and Rhythm: Normal rate and regular rhythm.   Pulmonary:      Effort: Pulmonary effort is normal. No respiratory distress.   Abdominal:      General: Abdomen is flat. There is no distension.   Musculoskeletal:         General: No deformity. Normal range of motion.      Cervical back: Normal range of motion. No rigidity.   Neurological:      General: No focal deficit present.      Mental Status: He is alert and oriented to person, place, and time.      Comments: Left-sided facial droop.  Extremity limited movement of the left forehead, unable to fully close left eye.  Follows commands well, able to blink and make fists.  Alert and oriented to name age and month.   Extraocular movement intact without nystagmus.  Visual fields full.  No drift in bilateral upper or lower extremities, no ataxia on FTN or HTS.  Sensation intact light touch in all extremities with out asymmetry, no extinction.  Repetition intact, object naming intact.  No clear dysarthria noted by this evaluator.   Psychiatric:         Mood and Affect: Mood normal.         Behavior: Behavior normal.             COMPLETED DIAGNOSTIC STUDIES AND INTERVENTIONS:     Lab Results (last 24 hours)     ** No results found for the last 24 hours. **            CT Head Without Contrast   Final Result      No acute intracranial abnormality.      Signer Name: Joey Adams MD    Signed: 9/3/2022 4:59 PM    Workstation Name: [id number]     [facility]            New Medications Ordered This Visit   Medications   • valACYclovir (VALTREX) tablet 1,000 mg   • predniSONE (DELTASONE) tablet 80 mg   • predniSONE (DELTASONE) 20 MG tablet     Sig: Take 4 tablets by mouth Daily for 7 days.     Dispense:  28 tablet     Refill:  0   • valACYclovir (VALTREX) 1000 MG tablet     Sig: Take 1 tablet by mouth 3 (Three) Times a Day for 7 days.     Dispense:  21 tablet     Refill:  0         Procedures            MEDICAL DECISION-MAKING AND ED COURSE:     ED Course as of 09/03/22 1943   Sat Sep 03, 2022   1940 MDM: 42-year-old male presents with left-sided facial droop exam findings consistent with Bell's palsy.  Discussed with neurologist Dr. Goldstein who requested video of facial exam and agreed with findings.  Did recommend CT imaging of the head which returned with no acute findings.  Treat patient with prednisone and valacyclovir for 7 days and return for any new onset or worsening symptoms.   [KP]      ED Course User Index  [KP] Willy Wilson MD       ?      FINAL IMPRESSION:       1. Bell's palsy         The complaints listed here are new problems to this examiner.      FOLLOW-UP  Romain Koehler MD  [address] [place] [address]  MARIO FRENCH  OSCAR A  [facility] [address]  [phone]    Schedule an appointment as soon as possible for a visit         DISPOSITION  ED Disposition     ED Disposition   Discharge    Condition   Stable    Comment   --                   This care is provided during an unprecedented national emergency due to the Novel Coronavirus (COVID-19). COVID-19 infections and transmission risks place heavy strains on healthcare resources. As this pandemic evolves, the Hospital and providers strive to respond fluidly, to remain operational, and to provide care relative to available resources and information. Outcomes are unpredictable and treatments are without well-defined guidelines. Further, the impact of COVID-19 on all aspects of emergency care, including the impact to patients seeking care for reasons other than COVID-19, is unavoidable during this national emergency.    This note was dictated using a [id number] tool. Occasional wrong-word or 'sound-a-like' substitutions may have occurred due to the inherent limitations of voice recognition software. ?Read the chart carefully and recognize, using context, where substitutions have occurred.    Willy Wilson MD  19:43 EDT  9/3/2022             Willy Wilson MD  09/03/22 1943

## 2022-10-07 ENCOUNTER — HOSPITAL ENCOUNTER (OUTPATIENT)
Dept: GENERAL RADIOLOGY | Facility: HOSPITAL | Age: 42
Discharge: HOME | End: 2022-10-07
Admitting: NURSE PRACTITIONER

## 2022-10-07 ENCOUNTER — TRANSCRIBE ORDERS (OUTPATIENT)
Dept: ADMINISTRATIVE | Facility: HOSPITAL | Age: 42
End: 2022-10-07

## 2022-10-07 DIAGNOSIS — M41.9 SCOLIOSIS, UNSPECIFIED SCOLIOSIS TYPE, UNSPECIFIED SPINAL REGION: ICD-10-CM

## 2022-10-07 DIAGNOSIS — M41.9 SCOLIOSIS, UNSPECIFIED SCOLIOSIS TYPE, UNSPECIFIED SPINAL REGION: Primary | ICD-10-CM

## 2022-10-07 PROCEDURE — 72081 X-RAY EXAM ENTIRE SPI 1 VW: CPT | Performed by: RADIOLOGY

## 2022-10-07 PROCEDURE — 72081 X-RAY EXAM ENTIRE SPI 1 VW: CPT

## 2022-12-20 ENCOUNTER — TRANSCRIBE ORDERS (OUTPATIENT)
Dept: ADMINISTRATIVE | Facility: HOSPITAL | Age: 42
End: 2022-12-20

## 2022-12-20 ENCOUNTER — HOSPITAL ENCOUNTER (OUTPATIENT)
Dept: GENERAL RADIOLOGY | Facility: HOSPITAL | Age: 42
Discharge: HOME | End: 2022-12-20
Admitting: SPECIALIST

## 2022-12-20 DIAGNOSIS — J43.9 PULMONARY EMPHYSEMA, UNSPECIFIED EMPHYSEMA TYPE: Primary | ICD-10-CM

## 2022-12-20 DIAGNOSIS — J43.9 PULMONARY EMPHYSEMA, UNSPECIFIED EMPHYSEMA TYPE: ICD-10-CM

## 2022-12-20 PROCEDURE — 71046 X-RAY EXAM CHEST 2 VIEWS: CPT

## 2022-12-20 PROCEDURE — 71046 X-RAY EXAM CHEST 2 VIEWS: CPT | Performed by: RADIOLOGY

## 2023-12-16 ENCOUNTER — APPOINTMENT (OUTPATIENT)
Dept: CT IMAGING | Facility: HOSPITAL | Age: 43
End: 2023-12-16

## 2023-12-16 ENCOUNTER — HOSPITAL ENCOUNTER (EMERGENCY)
Facility: HOSPITAL | Age: 43
Discharge: HOME | End: 2023-12-16
Attending: STUDENT IN AN ORGANIZED HEALTH CARE EDUCATION/TRAINING PROGRAM

## 2023-12-16 ENCOUNTER — APPOINTMENT (OUTPATIENT)
Dept: CT IMAGING | Facility: HOSPITAL | Age: 43
End: 2023-12-16
Payer: MEDICAID

## 2023-12-16 VITALS
TEMPERATURE: 98 F | OXYGEN SATURATION: 93 % | RESPIRATION RATE: 16 BRPM | BODY MASS INDEX: 39.99 KG/M2 | DIASTOLIC BLOOD PRESSURE: 82 MMHG | HEART RATE: 87 BPM | WEIGHT: 270 LBS | SYSTOLIC BLOOD PRESSURE: 136 MMHG | HEIGHT: 69 IN

## 2023-12-16 DIAGNOSIS — S30.1XXA ABDOMINAL HEMATOMA: Primary | ICD-10-CM

## 2023-12-16 LAB
ALBUMIN SERPL-MCNC: 3.7 G/DL (ref 3.5–5.2)
ALBUMIN/GLOB SERPL: 1.3 G/DL
ALP SERPL-CCNC: 72 U/L (ref 39–117)
ALT SERPL W P-5'-P-CCNC: 17 U/L (ref 1–41)
ANION GAP SERPL CALCULATED.3IONS-SCNC: 10.7 MMOL/L (ref 5–15)
APTT PPP: 29.7 SECONDS (ref 26.5–34.5)
AST SERPL-CCNC: 11 U/L (ref 1–40)
BASOPHILS # BLD AUTO: 0.05 10*3/MM3 (ref 0–0.2)
BASOPHILS NFR BLD AUTO: 0.3 % (ref 0–1.5)
BILIRUB SERPL-MCNC: 0.6 MG/DL (ref 0–1.2)
BILIRUB UR QL STRIP: NEGATIVE
BUN SERPL-MCNC: 14 MG/DL (ref 6–20)
BUN/CREAT SERPL: 19.4 (ref 7–25)
CALCIUM SPEC-SCNC: 8.8 MG/DL (ref 8.6–10.5)
CHLORIDE SERPL-SCNC: 106 MMOL/L (ref 98–107)
CLARITY UR: ABNORMAL
CO2 SERPL-SCNC: 24.3 MMOL/L (ref 22–29)
COLOR UR: YELLOW
CREAT SERPL-MCNC: 0.72 MG/DL (ref 0.76–1.27)
D-LACTATE SERPL-SCNC: 1.1 MMOL/L (ref 0.5–2)
DEPRECATED RDW RBC AUTO: 45.7 FL (ref 37–54)
EGFRCR SERPLBLD CKD-EPI 2021: 115.5 ML/MIN/1.73
EOSINOPHIL # BLD AUTO: 0.2 10*3/MM3 (ref 0–0.4)
EOSINOPHIL NFR BLD AUTO: 1.3 % (ref 0.3–6.2)
ERYTHROCYTE [DISTWIDTH] IN BLOOD BY AUTOMATED COUNT: 13.9 % (ref 12.3–15.4)
GLOBULIN UR ELPH-MCNC: 2.8 GM/DL
GLUCOSE SERPL-MCNC: 125 MG/DL (ref 65–99)
GLUCOSE UR STRIP-MCNC: NEGATIVE MG/DL
HCT VFR BLD AUTO: 47.3 % (ref 37.5–51)
HGB BLD-MCNC: 15.2 G/DL (ref 13–17.7)
HGB UR QL STRIP.AUTO: NEGATIVE
HOLD SPECIMEN: NORMAL
IMM GRANULOCYTES # BLD AUTO: 0.14 10*3/MM3 (ref 0–0.05)
IMM GRANULOCYTES NFR BLD AUTO: 0.9 % (ref 0–0.5)
INR PPP: 0.95 (ref 0.9–1.1)
KETONES UR QL STRIP: NEGATIVE
LEUKOCYTE ESTERASE UR QL STRIP.AUTO: NEGATIVE
LIPASE SERPL-CCNC: 19 U/L (ref 13–60)
LYMPHOCYTES # BLD AUTO: 3.13 10*3/MM3 (ref 0.7–3.1)
LYMPHOCYTES NFR BLD AUTO: 19.9 % (ref 19.6–45.3)
MCH RBC QN AUTO: 28.9 PG (ref 26.6–33)
MCHC RBC AUTO-ENTMCNC: 32.1 G/DL (ref 31.5–35.7)
MCV RBC AUTO: 89.9 FL (ref 79–97)
MONOCYTES # BLD AUTO: 0.91 10*3/MM3 (ref 0.1–0.9)
MONOCYTES NFR BLD AUTO: 5.8 % (ref 5–12)
NEUTROPHILS NFR BLD AUTO: 11.29 10*3/MM3 (ref 1.7–7)
NEUTROPHILS NFR BLD AUTO: 71.8 % (ref 42.7–76)
NITRITE UR QL STRIP: NEGATIVE
NRBC BLD AUTO-RTO: 0 /100 WBC (ref 0–0.2)
PH UR STRIP.AUTO: 6 [PH] (ref 5–8)
PLATELET # BLD AUTO: 316 10*3/MM3 (ref 140–450)
PMV BLD AUTO: 8.9 FL (ref 6–12)
POTASSIUM SERPL-SCNC: 3.9 MMOL/L (ref 3.5–5.2)
PROT SERPL-MCNC: 6.5 G/DL (ref 6–8.5)
PROT UR QL STRIP: NEGATIVE
PROTHROMBIN TIME: 13.2 SECONDS (ref 12.1–14.7)
RBC # BLD AUTO: 5.26 10*6/MM3 (ref 4.14–5.8)
SODIUM SERPL-SCNC: 141 MMOL/L (ref 136–145)
SP GR UR STRIP: 1.02 (ref 1–1.03)
UROBILINOGEN UR QL STRIP: ABNORMAL
WBC NRBC COR # BLD AUTO: 15.72 10*3/MM3 (ref 3.4–10.8)
WHOLE BLOOD HOLD COAG: NORMAL
WHOLE BLOOD HOLD SPECIMEN: NORMAL

## 2023-12-16 PROCEDURE — 81003 URINALYSIS AUTO W/O SCOPE: CPT | Performed by: STUDENT IN AN ORGANIZED HEALTH CARE EDUCATION/TRAINING PROGRAM

## 2023-12-16 PROCEDURE — 85730 THROMBOPLASTIN TIME PARTIAL: CPT | Performed by: STUDENT IN AN ORGANIZED HEALTH CARE EDUCATION/TRAINING PROGRAM

## 2023-12-16 PROCEDURE — 74176 CT ABD & PELVIS W/O CONTRAST: CPT

## 2023-12-16 PROCEDURE — 83605 ASSAY OF LACTIC ACID: CPT | Performed by: STUDENT IN AN ORGANIZED HEALTH CARE EDUCATION/TRAINING PROGRAM

## 2023-12-16 PROCEDURE — 25510000001 IOPAMIDOL PER 1 ML: Performed by: STUDENT IN AN ORGANIZED HEALTH CARE EDUCATION/TRAINING PROGRAM

## 2023-12-16 PROCEDURE — 96374 THER/PROPH/DIAG INJ IV PUSH: CPT

## 2023-12-16 PROCEDURE — 85025 COMPLETE CBC W/AUTO DIFF WBC: CPT | Performed by: STUDENT IN AN ORGANIZED HEALTH CARE EDUCATION/TRAINING PROGRAM

## 2023-12-16 PROCEDURE — 25010000002 HYDROMORPHONE 1 MG/ML SOLUTION: Performed by: NURSE PRACTITIONER

## 2023-12-16 PROCEDURE — 96376 TX/PRO/DX INJ SAME DRUG ADON: CPT

## 2023-12-16 PROCEDURE — 85610 PROTHROMBIN TIME: CPT | Performed by: STUDENT IN AN ORGANIZED HEALTH CARE EDUCATION/TRAINING PROGRAM

## 2023-12-16 PROCEDURE — 71275 CT ANGIOGRAPHY CHEST: CPT

## 2023-12-16 PROCEDURE — 96375 TX/PRO/DX INJ NEW DRUG ADDON: CPT

## 2023-12-16 PROCEDURE — 74177 CT ABD & PELVIS W/CONTRAST: CPT

## 2023-12-16 PROCEDURE — 83690 ASSAY OF LIPASE: CPT | Performed by: STUDENT IN AN ORGANIZED HEALTH CARE EDUCATION/TRAINING PROGRAM

## 2023-12-16 PROCEDURE — 25010000002 MEPERIDINE PER 100 MG: Performed by: NURSE PRACTITIONER

## 2023-12-16 PROCEDURE — 71250 CT THORAX DX C-: CPT

## 2023-12-16 PROCEDURE — 80053 COMPREHEN METABOLIC PANEL: CPT | Performed by: STUDENT IN AN ORGANIZED HEALTH CARE EDUCATION/TRAINING PROGRAM

## 2023-12-16 PROCEDURE — 25810000003 SODIUM CHLORIDE 0.9 % SOLUTION: Performed by: NURSE PRACTITIONER

## 2023-12-16 PROCEDURE — 25010000002 FENTANYL CITRATE (PF) 50 MCG/ML SOLUTION: Performed by: NURSE PRACTITIONER

## 2023-12-16 PROCEDURE — 25010000002 PROCHLORPERAZINE 10 MG/2ML SOLUTION: Performed by: NURSE PRACTITIONER

## 2023-12-16 PROCEDURE — 99285 EMERGENCY DEPT VISIT HI MDM: CPT

## 2023-12-16 RX ORDER — HYDROCODONE BITARTRATE AND ACETAMINOPHEN 5; 325 MG/1; MG/1
1 TABLET ORAL ONCE
Status: COMPLETED | OUTPATIENT
Start: 2023-12-16 | End: 2023-12-16

## 2023-12-16 RX ORDER — HYDROCODONE POLISTIREX AND CHLORPHENIRAMINE POLISTIREX 10; 8 MG/5ML; MG/5ML
5 SUSPENSION, EXTENDED RELEASE ORAL ONCE
Status: COMPLETED | OUTPATIENT
Start: 2023-12-16 | End: 2023-12-16

## 2023-12-16 RX ORDER — SODIUM CHLORIDE 0.9 % (FLUSH) 0.9 %
10 SYRINGE (ML) INJECTION AS NEEDED
Status: DISCONTINUED | OUTPATIENT
Start: 2023-12-16 | End: 2023-12-16 | Stop reason: HOSPADM

## 2023-12-16 RX ORDER — PROCHLORPERAZINE EDISYLATE 5 MG/ML
5 INJECTION INTRAMUSCULAR; INTRAVENOUS ONCE
Status: COMPLETED | OUTPATIENT
Start: 2023-12-16 | End: 2023-12-16

## 2023-12-16 RX ORDER — MEPERIDINE HYDROCHLORIDE 25 MG/ML
25 INJECTION INTRAMUSCULAR; INTRAVENOUS; SUBCUTANEOUS ONCE
Status: COMPLETED | OUTPATIENT
Start: 2023-12-16 | End: 2023-12-16

## 2023-12-16 RX ORDER — FENTANYL CITRATE 50 UG/ML
25 INJECTION, SOLUTION INTRAMUSCULAR; INTRAVENOUS
Status: DISCONTINUED | OUTPATIENT
Start: 2023-12-16 | End: 2023-12-16 | Stop reason: HOSPADM

## 2023-12-16 RX ADMIN — FENTANYL CITRATE 25 MCG: 50 INJECTION, SOLUTION INTRAMUSCULAR; INTRAVENOUS at 20:04

## 2023-12-16 RX ADMIN — FENTANYL CITRATE 25 MCG: 50 INJECTION, SOLUTION INTRAMUSCULAR; INTRAVENOUS at 18:57

## 2023-12-16 RX ADMIN — MEPERIDINE HYDROCHLORIDE 25 MG: 25 INJECTION, SOLUTION INTRAMUSCULAR; INTRAVENOUS; SUBCUTANEOUS at 16:19

## 2023-12-16 RX ADMIN — HYDROCODONE BITARTRATE AND ACETAMINOPHEN 1 TABLET: 5; 325 TABLET ORAL at 21:46

## 2023-12-16 RX ADMIN — PROCHLORPERAZINE EDISYLATE 5 MG: 5 INJECTION INTRAMUSCULAR; INTRAVENOUS at 16:19

## 2023-12-16 RX ADMIN — IOPAMIDOL 70 ML: 755 INJECTION, SOLUTION INTRAVENOUS at 19:32

## 2023-12-16 RX ADMIN — HYDROMORPHONE HYDROCHLORIDE 1 MG: 1 INJECTION, SOLUTION INTRAMUSCULAR; INTRAVENOUS; SUBCUTANEOUS at 21:45

## 2023-12-16 RX ADMIN — SODIUM CHLORIDE 1000 ML: 9 INJECTION, SOLUTION INTRAVENOUS at 16:19

## 2023-12-16 RX ADMIN — HYDROCODONE POLISTIREX AND CHLORPHENIRAMINE POLISTIREX 5 ML: 10; 8 SUSPENSION, EXTENDED RELEASE ORAL at 17:14

## 2023-12-17 NOTE — ED PROVIDER NOTES
Subjective   History of Present Illness  Patient is a 43-year-old male with no significant past medical history presenting to the ER complaints of cough, left-sided abdominal pain after a coughing episode.  Patient reports that he has been coughing for several weeks.  Patient reports nonproductive cough.  Patient reports that he took a coughing spell and when he did he felt something pull in his left side of his abdomen and then developed bruising alongside the site of pain.  Patient denies fever, nausea, vomiting, shortness of air or any additional symptoms today.  Patient denies any alleviating or worsening factors.    History provided by:  Patient   used: No        Review of Systems   Constitutional: Negative.  Negative for fever.   HENT: Negative.     Respiratory: Negative.  Positive for cough.    Cardiovascular: Negative.  Negative for chest pain.   Gastrointestinal: Negative.  Positive for abdominal pain.   Endocrine: Negative.    Genitourinary: Negative.  Negative for dysuria.   Skin: Negative.  Positive for color change.   Neurological: Negative.    Psychiatric/Behavioral: Negative.     All other systems reviewed and are negative.      Past Medical History:   Diagnosis Date    GERD (gastroesophageal reflux disease)     Hypercholesteremia        Allergies   Allergen Reactions    Bee Venom     Latex     Morphine And Related     Pneumococcal Vaccines Hives    Ppd [Tuberculin Purified Protein Derivative]        Past Surgical History:   Procedure Laterality Date    CYST REMOVAL         Family History   Problem Relation Age of Onset    Arthritis Mother     Diabetes Father     Heart disease Father     Hypertension Father     Arthritis Father     No Known Problems Brother        Social History     Socioeconomic History    Marital status:    Tobacco Use    Smoking status: Every Day     Packs/day: 2     Types: Cigarettes    Smokeless tobacco: Current     Types: Chew   Substance and Sexual  Activity    Alcohol use: No    Drug use: No    Sexual activity: Defer           Objective   Physical Exam  Vitals and nursing note reviewed.   Constitutional:       General: He is not in acute distress.     Appearance: He is well-developed. He is not diaphoretic.   HENT:      Head: Normocephalic and atraumatic.      Right Ear: External ear normal.      Left Ear: External ear normal.      Nose: Nose normal.   Eyes:      Conjunctiva/sclera: Conjunctivae normal.      Pupils: Pupils are equal, round, and reactive to light.   Neck:      Vascular: No JVD.      Trachea: No tracheal deviation.   Cardiovascular:      Rate and Rhythm: Normal rate and regular rhythm.      Heart sounds: Normal heart sounds. No murmur heard.  Pulmonary:      Effort: Pulmonary effort is normal. No respiratory distress.      Breath sounds: Normal breath sounds. No wheezing.   Abdominal:      General: Bowel sounds are normal.      Palpations: Abdomen is soft.      Tenderness: There is abdominal tenderness in the left upper quadrant and left lower quadrant.   Musculoskeletal:         General: No deformity. Normal range of motion.      Cervical back: Normal range of motion and neck supple.   Skin:     General: Skin is warm and dry.      Coloration: Skin is not pale.      Findings: Abrasion present. No erythema or rash.   Neurological:      Mental Status: He is alert and oriented to person, place, and time.      Cranial Nerves: No cranial nerve deficit.   Psychiatric:         Behavior: Behavior normal.         Thought Content: Thought content normal.         Procedures       Results for orders placed or performed during the hospital encounter of 12/16/23   Urinalysis With Microscopic If Indicated (No Culture) - Urine, Clean Catch    Specimen: Urine, Clean Catch   Result Value Ref Range    Color, UA Yellow Yellow, Straw    Appearance, UA Cloudy (A) Clear    pH, UA 6.0 5.0 - 8.0    Specific Gravity, UA 1.017 1.005 - 1.030    Glucose, UA Negative  Negative    Ketones, UA Negative Negative    Bilirubin, UA Negative Negative    Blood, UA Negative Negative    Protein, UA Negative Negative    Leuk Esterase, UA Negative Negative    Nitrite, UA Negative Negative    Urobilinogen, UA 1.0 E.U./dL 0.2 - 1.0 E.U./dL   aPTT    Specimen: Blood   Result Value Ref Range    PTT 29.7 26.5 - 34.5 seconds   Comprehensive Metabolic Panel    Specimen: Blood   Result Value Ref Range    Glucose 125 (H) 65 - 99 mg/dL    BUN 14 6 - 20 mg/dL    Creatinine 0.72 (L) 0.76 - 1.27 mg/dL    Sodium 141 136 - 145 mmol/L    Potassium 3.9 3.5 - 5.2 mmol/L    Chloride 106 98 - 107 mmol/L    CO2 24.3 22.0 - 29.0 mmol/L    Calcium 8.8 8.6 - 10.5 mg/dL    Total Protein 6.5 6.0 - 8.5 g/dL    Albumin 3.7 3.5 - 5.2 g/dL    ALT (SGPT) 17 1 - 41 U/L    AST (SGOT) 11 1 - 40 U/L    Alkaline Phosphatase 72 39 - 117 U/L    Total Bilirubin 0.6 0.0 - 1.2 mg/dL    Globulin 2.8 gm/dL    A/G Ratio 1.3 g/dL    BUN/Creatinine Ratio 19.4 7.0 - 25.0    Anion Gap 10.7 5.0 - 15.0 mmol/L    eGFR 115.5 >60.0 mL/min/1.73   Lactic Acid, Plasma    Specimen: Blood   Result Value Ref Range    Lactate 1.1 0.5 - 2.0 mmol/L   Lipase    Specimen: Blood   Result Value Ref Range    Lipase 19 13 - 60 U/L   Protime-INR    Specimen: Blood   Result Value Ref Range    Protime 13.2 12.1 - 14.7 Seconds    INR 0.95 0.90 - 1.10   CBC Auto Differential    Specimen: Blood   Result Value Ref Range    WBC 15.72 (H) 3.40 - 10.80 10*3/mm3    RBC 5.26 4.14 - 5.80 10*6/mm3    Hemoglobin 15.2 13.0 - 17.7 g/dL    Hematocrit 47.3 37.5 - 51.0 %    MCV 89.9 79.0 - 97.0 fL    MCH 28.9 26.6 - 33.0 pg    MCHC 32.1 31.5 - 35.7 g/dL    RDW 13.9 12.3 - 15.4 %    RDW-SD 45.7 37.0 - 54.0 fl    MPV 8.9 6.0 - 12.0 fL    Platelets 316 140 - 450 10*3/mm3    Neutrophil % 71.8 42.7 - 76.0 %    Lymphocyte % 19.9 19.6 - 45.3 %    Monocyte % 5.8 5.0 - 12.0 %    Eosinophil % 1.3 0.3 - 6.2 %    Basophil % 0.3 0.0 - 1.5 %    Immature Grans % 0.9 (H) 0.0 - 0.5 %     Neutrophils, Absolute 11.29 (H) 1.70 - 7.00 10*3/mm3    Lymphocytes, Absolute 3.13 (H) 0.70 - 3.10 10*3/mm3    Monocytes, Absolute 0.91 (H) 0.10 - 0.90 10*3/mm3    Eosinophils, Absolute 0.20 0.00 - 0.40 10*3/mm3    Basophils, Absolute 0.05 0.00 - 0.20 10*3/mm3    Immature Grans, Absolute 0.14 (H) 0.00 - 0.05 10*3/mm3    nRBC 0.0 0.0 - 0.2 /100 WBC   Green Top (Gel)   Result Value Ref Range    Extra Tube Hold for add-ons.    Lavender Top   Result Value Ref Range    Extra Tube hold for add-on    Gold Top - SST   Result Value Ref Range    Extra Tube Hold for add-ons.    Gray Top   Result Value Ref Range    Extra Tube Hold for add-ons.    Light Blue Top   Result Value Ref Range    Extra Tube Hold for add-ons.       CT Abdomen Pelvis With Contrast   Final Result       1. Similar left hemiabdominal subcutaneous fat stranding as described   previously, with no evidence of active bleeding amongst this.                   To TALK to On Call [phone]       This report was finalized on 12/16/2023 9:07 PM by Ronak Carl MD.          CT Angiogram Chest Pulmonary Embolism   Final Result       1. Redemonstrated small left pleural effusion, with no definite mass,   and no definite areas of active bleeding.                   To TALK to On Call [phone]       This report was finalized on 12/16/2023 9:07 PM by Ronak Carl MD.          CT Abdomen Pelvis Without Contrast   Final Result       1. There is asymmetric subcutaneous fat stranding throughout the left   anterior and lateral subcutaneous fat, possibly minimal blood/edema   related to trauma, though the origin of this is unclear (perhaps   intercostal injury given the history and small left pleural effusion?).       2. No acute intra-abdominal abnormality, with no evidence of organ   injury or hernia defect identified.       3. Significant sigmoid diverticulosis.                   To TALK to On Call [phone]       This report  was finalized on 12/16/2023 6:03 PM by Ronak Carl MD.          CT Chest Without Contrast Diagnostic   Final Result       1. There is a small left pleural effusion, with small elliptical   masslike high attenuation focus along the lateral aspect of the fusion,   unclear if possibly reflecting a small mass or small hematoma (favored);   evaluation is limited by lack of IV contrast. Consider contrast enhanced   CT chest exam.       2. No other acute abnormality identified.       3. Mild to moderate coronary artery calcification.                   To TALK to On Call [phone]       This report was finalized on 12/16/2023 6:02 PM by Ronak Carl MD.               ED Course  ED Course as of 12/17/23 1628   Sat Dec 16, 2023   2112 CT Angiogram Chest Pulmonary Embolism [SM]   2112 CT Chest Without Contrast Diagnostic []   2112 CT Abdomen Pelvis Without Contrast [SM]   2112 CT Abdomen Pelvis With Contrast [SM]      ED Course User Index  [SM] Baylee Winter APRN                                             Medical Decision Making  Patient is a 43-year-old male with no significant past medical history presenting to the ER complaints of cough, left-sided abdominal pain after a coughing episode.  Patient reports that he has been coughing for several weeks.  Patient reports nonproductive cough.  Patient reports that he took a coughing spell and when he did he felt something pull in his left side of his abdomen and then developed bruising alongside the site of pain.  Patient denies fever, nausea, vomiting, shortness of air or any additional symptoms today.  Patient denies any alleviating or worsening factors.    Advised patient to return to the ER with new or worsening symptoms.  Advised patient to follow-up with PCP.  Patient verbalized understanding and agrees.  Vital signs are stable at discharge.  Patient is in no acute distress.    Problems Addressed:  Abdominal hematoma: complicated acute illness  or injury    Amount and/or Complexity of Data Reviewed  Labs: ordered.  Radiology: ordered. Decision-making details documented in ED Course.    Risk  Prescription drug management.        Final diagnoses:   Abdominal hematoma       ED Disposition  ED Disposition       ED Disposition   Discharge    Condition   Stable    Comment   --               Dariela, Elida Parsons, APRN  [id number]  [address]  [address] [address]  [phone]    Schedule an appointment as soon as possible for a visit            Medication List      No changes were made to your prescriptions during this visit.            Baylee Winter, APRN  12/17/23 9328

## 2023-12-25 ENCOUNTER — APPOINTMENT (OUTPATIENT)
Dept: GENERAL RADIOLOGY | Facility: HOSPITAL | Age: 43
End: 2023-12-25
Payer: MEDICAID

## 2023-12-25 ENCOUNTER — HOSPITAL ENCOUNTER (EMERGENCY)
Facility: HOSPITAL | Age: 43
Discharge: HOME | End: 2023-12-25
Attending: STUDENT IN AN ORGANIZED HEALTH CARE EDUCATION/TRAINING PROGRAM | Admitting: STUDENT IN AN ORGANIZED HEALTH CARE EDUCATION/TRAINING PROGRAM
Payer: MEDICAID

## 2023-12-25 VITALS
BODY MASS INDEX: 39.99 KG/M2 | RESPIRATION RATE: 20 BRPM | WEIGHT: 270 LBS | OXYGEN SATURATION: 94 % | SYSTOLIC BLOOD PRESSURE: 168 MMHG | TEMPERATURE: 98 F | DIASTOLIC BLOOD PRESSURE: 99 MMHG | HEIGHT: 69 IN | HEART RATE: 99 BPM

## 2023-12-25 DIAGNOSIS — J18.9 PNEUMONIA OF LEFT LOWER LOBE DUE TO INFECTIOUS ORGANISM: Primary | ICD-10-CM

## 2023-12-25 LAB
ALBUMIN SERPL-MCNC: 3.8 G/DL (ref 3.5–5.2)
ALBUMIN/GLOB SERPL: 1.2 G/DL
ALP SERPL-CCNC: 74 U/L (ref 39–117)
ALT SERPL W P-5'-P-CCNC: 12 U/L (ref 1–41)
ANION GAP SERPL CALCULATED.3IONS-SCNC: 11.6 MMOL/L (ref 5–15)
AST SERPL-CCNC: 10 U/L (ref 1–40)
BASOPHILS # BLD AUTO: 0.05 10*3/MM3 (ref 0–0.2)
BASOPHILS NFR BLD AUTO: 0.4 % (ref 0–1.5)
BILIRUB SERPL-MCNC: 0.7 MG/DL (ref 0–1.2)
BUN SERPL-MCNC: 7 MG/DL (ref 6–20)
BUN/CREAT SERPL: 10.3 (ref 7–25)
CALCIUM SPEC-SCNC: 9.1 MG/DL (ref 8.6–10.5)
CHLORIDE SERPL-SCNC: 104 MMOL/L (ref 98–107)
CO2 SERPL-SCNC: 25.4 MMOL/L (ref 22–29)
CREAT SERPL-MCNC: 0.68 MG/DL (ref 0.76–1.27)
DEPRECATED RDW RBC AUTO: 47.1 FL (ref 37–54)
EGFRCR SERPLBLD CKD-EPI 2021: 117.5 ML/MIN/1.73
EOSINOPHIL # BLD AUTO: 0.2 10*3/MM3 (ref 0–0.4)
EOSINOPHIL NFR BLD AUTO: 1.7 % (ref 0.3–6.2)
ERYTHROCYTE [DISTWIDTH] IN BLOOD BY AUTOMATED COUNT: 14.1 % (ref 12.3–15.4)
FLUAV RNA RESP QL NAA+PROBE: NOT DETECTED
FLUBV RNA RESP QL NAA+PROBE: NOT DETECTED
GLOBULIN UR ELPH-MCNC: 3.2 GM/DL
GLUCOSE SERPL-MCNC: 120 MG/DL (ref 65–99)
HCT VFR BLD AUTO: 45.3 % (ref 37.5–51)
HGB BLD-MCNC: 14.5 G/DL (ref 13–17.7)
HOLD SPECIMEN: NORMAL
HOLD SPECIMEN: NORMAL
IMM GRANULOCYTES # BLD AUTO: 0.07 10*3/MM3 (ref 0–0.05)
IMM GRANULOCYTES NFR BLD AUTO: 0.6 % (ref 0–0.5)
LYMPHOCYTES # BLD AUTO: 2.08 10*3/MM3 (ref 0.7–3.1)
LYMPHOCYTES NFR BLD AUTO: 17.6 % (ref 19.6–45.3)
MCH RBC QN AUTO: 29.2 PG (ref 26.6–33)
MCHC RBC AUTO-ENTMCNC: 32 G/DL (ref 31.5–35.7)
MCV RBC AUTO: 91.1 FL (ref 79–97)
MONOCYTES # BLD AUTO: 0.93 10*3/MM3 (ref 0.1–0.9)
MONOCYTES NFR BLD AUTO: 7.9 % (ref 5–12)
NEUTROPHILS NFR BLD AUTO: 71.8 % (ref 42.7–76)
NEUTROPHILS NFR BLD AUTO: 8.51 10*3/MM3 (ref 1.7–7)
NRBC BLD AUTO-RTO: 0 /100 WBC (ref 0–0.2)
NT-PROBNP SERPL-MCNC: <36 PG/ML (ref 0–450)
PLATELET # BLD AUTO: 324 10*3/MM3 (ref 140–450)
PMV BLD AUTO: 9.4 FL (ref 6–12)
POTASSIUM SERPL-SCNC: 3.8 MMOL/L (ref 3.5–5.2)
PROT SERPL-MCNC: 7 G/DL (ref 6–8.5)
RBC # BLD AUTO: 4.97 10*6/MM3 (ref 4.14–5.8)
SARS-COV-2 RNA RESP QL NAA+PROBE: NOT DETECTED
SODIUM SERPL-SCNC: 141 MMOL/L (ref 136–145)
TROPONIN T SERPL HS-MCNC: 19 NG/L
WBC NRBC COR # BLD AUTO: 11.84 10*3/MM3 (ref 3.4–10.8)
WHOLE BLOOD HOLD COAG: NORMAL
WHOLE BLOOD HOLD SPECIMEN: NORMAL

## 2023-12-25 PROCEDURE — 85025 COMPLETE CBC W/AUTO DIFF WBC: CPT | Performed by: STUDENT IN AN ORGANIZED HEALTH CARE EDUCATION/TRAINING PROGRAM

## 2023-12-25 PROCEDURE — 71045 X-RAY EXAM CHEST 1 VIEW: CPT

## 2023-12-25 PROCEDURE — 99284 EMERGENCY DEPT VISIT MOD MDM: CPT

## 2023-12-25 PROCEDURE — 84484 ASSAY OF TROPONIN QUANT: CPT | Performed by: STUDENT IN AN ORGANIZED HEALTH CARE EDUCATION/TRAINING PROGRAM

## 2023-12-25 PROCEDURE — 25810000003 SODIUM CHLORIDE 0.9 % SOLUTION: Performed by: STUDENT IN AN ORGANIZED HEALTH CARE EDUCATION/TRAINING PROGRAM

## 2023-12-25 PROCEDURE — 93005 ELECTROCARDIOGRAM TRACING: CPT | Performed by: STUDENT IN AN ORGANIZED HEALTH CARE EDUCATION/TRAINING PROGRAM

## 2023-12-25 PROCEDURE — 36415 COLL VENOUS BLD VENIPUNCTURE: CPT

## 2023-12-25 PROCEDURE — 87636 SARSCOV2 & INF A&B AMP PRB: CPT | Performed by: STUDENT IN AN ORGANIZED HEALTH CARE EDUCATION/TRAINING PROGRAM

## 2023-12-25 PROCEDURE — 83880 ASSAY OF NATRIURETIC PEPTIDE: CPT | Performed by: STUDENT IN AN ORGANIZED HEALTH CARE EDUCATION/TRAINING PROGRAM

## 2023-12-25 PROCEDURE — 80053 COMPREHEN METABOLIC PANEL: CPT | Performed by: STUDENT IN AN ORGANIZED HEALTH CARE EDUCATION/TRAINING PROGRAM

## 2023-12-25 RX ORDER — AMOXICILLIN AND CLAVULANATE POTASSIUM 875; 125 MG/1; MG/1
1 TABLET, FILM COATED ORAL ONCE
Status: COMPLETED | OUTPATIENT
Start: 2023-12-25 | End: 2023-12-25

## 2023-12-25 RX ORDER — GUAIFENESIN/DEXTROMETHORPHAN 100-10MG/5
5 SYRUP ORAL ONCE
Status: COMPLETED | OUTPATIENT
Start: 2023-12-25 | End: 2023-12-25

## 2023-12-25 RX ORDER — AMOXICILLIN AND CLAVULANATE POTASSIUM 875; 125 MG/1; MG/1
1 TABLET, FILM COATED ORAL 2 TIMES DAILY
Qty: 14 TABLET | Refills: 0 | OUTPATIENT
Start: 2023-12-25 | End: 2023-12-29

## 2023-12-25 RX ORDER — DOXYCYCLINE 100 MG/1
100 CAPSULE ORAL 2 TIMES DAILY
Qty: 14 CAPSULE | Refills: 0 | OUTPATIENT
Start: 2023-12-25 | End: 2023-12-29

## 2023-12-25 RX ORDER — DOXYCYCLINE 100 MG/1
100 CAPSULE ORAL ONCE
Status: COMPLETED | OUTPATIENT
Start: 2023-12-25 | End: 2023-12-25

## 2023-12-25 RX ORDER — SODIUM CHLORIDE 0.9 % (FLUSH) 0.9 %
10 SYRINGE (ML) INJECTION AS NEEDED
Status: DISCONTINUED | OUTPATIENT
Start: 2023-12-25 | End: 2023-12-25 | Stop reason: HOSPADM

## 2023-12-25 RX ADMIN — GUAIFENESIN AND DEXTROMETHORPHAN 5 ML: 100; 10 SYRUP ORAL at 15:50

## 2023-12-25 RX ADMIN — DOXYCYCLINE 100 MG: 100 CAPSULE ORAL at 15:50

## 2023-12-25 RX ADMIN — AMOXICILLIN AND CLAVULANATE POTASSIUM 1 TABLET: 875; 125 TABLET, FILM COATED ORAL at 15:50

## 2023-12-25 RX ADMIN — SODIUM CHLORIDE 1000 ML: 9 INJECTION, SOLUTION INTRAVENOUS at 14:34

## 2023-12-25 NOTE — ED PROVIDER NOTES
Subjective   History of Present Illness  43-year-old male history of obesity presents to the ED for cough and shortness of breath over the past several days.  Patient states he gets pneumonia often and is concerned that is what he is getting.  Was treated for bronchitis with steroids and antibiotics a couple weeks ago and it initially resolved before now getting worse.  No fevers.  No chest pain.    History provided by:  Patient   used: No        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]        Past Surgical History:   Procedure Laterality Date    CYST REMOVAL         Family History   Problem Relation Age of Onset    Arthritis Mother     Diabetes Father     Heart disease Father     Hypertension Father     Arthritis Father     No Known Problems Brother        Social History     Socioeconomic History    Marital status:    Tobacco Use    Smoking status: Every Day     Packs/day: 2     Types: Cigarettes    Smokeless tobacco: Current     Types: Chew   Substance and Sexual Activity    Alcohol use: No    Drug use: No    Sexual activity: Defer           Objective   Physical Exam  Constitutional:       General: He is not in acute distress.     Appearance: He is obese. He is not ill-appearing.   HENT:      Head: Normocephalic and atraumatic.   Eyes:      Conjunctiva/sclera: Conjunctivae normal.   Cardiovascular:      Rate and Rhythm: Normal rate and regular rhythm.      Heart sounds: Normal heart sounds.   Pulmonary:      Effort: Pulmonary effort is normal.      Breath sounds: Normal breath sounds. No decreased breath sounds or wheezing.      Comments: Coughing  Abdominal:      General: Abdomen is flat.      Palpations: Abdomen is soft.      Tenderness:  There is no abdominal tenderness.   Musculoskeletal:      Right lower leg: No edema.      Left lower leg: No edema.   Neurological:      General: No focal deficit present.      Mental Status: He is alert and oriented to person, place, and time. Mental status is at baseline.         Procedures           ED Course  ED Course as of 12/25/23 1533   Mon Dec 25, 2023   1425 EKG obtained and independently interpreted as normal sinus rhythm without acute ischemic findings or arrhythmia. [AS]      ED Course User Index  [AS] Terrell Cool MD                                             Medical Decision Making  43-year-old male presents ED with cough and shortness of breath.  Well-appearing on arrival.  Patient was slightly tachycardic with otherwise normal vitals.  EKG showed no acute findings.  Chest x-ray was read as CHF.  Patient was given fluids initially but these were stopped.  Labs show no acute findings and were not consistent with CHF.  Patient also has no lower extremity swelling given patient's harsh cough, I believe his x-ray findings are more consistent with left lower lobe pneumonia.  Treated with antibiotics patient was also given cough medication.  Well-appearing with normal vitals at time of discharge.    Problems Addressed:  Pneumonia of left lower lobe due to infectious organism: complicated acute illness or injury    Amount and/or Complexity of Data Reviewed  Labs: ordered.  Radiology: ordered.  ECG/medicine tests: ordered.    Risk  Prescription drug management.        Final diagnoses:   Pneumonia of left lower lobe due to infectious organism       ED Disposition  ED Disposition       ED Disposition   Discharge    Condition   Stable    Comment   --               Elida Lyle, APRN  [id number]  [address]  [facility] [address]  [facility] [address]  [phone]    Schedule an appointment as soon as possible for a visit       [facility] EMERGENCY DEPARTMENT  [address] [place] [address]  [phone]    If  symptoms worsen         Medication List        New Prescriptions      amoxicillin-clavulanate 875-125 MG per tablet  Commonly known as: AUGMENTIN  Take 1 tablet by mouth 2 (Two) Times a Day for 7 days.     doxycycline 100 MG capsule  Commonly known as: MONODOX  Take 1 capsule by mouth 2 (Two) Times a Day for 7 days.               Where to Get Your Medications        These medications were sent to [id number] - [place], [place] - [address] AT SEC [address] [facility] [phone] [facility] [phone]   [address] [place], [place] [address] [address]      Phone: [phone]   amoxicillin-clavulanate 875-125 MG per tablet  doxycycline 100 MG capsule            Terrell Cool MD  12/25/23 2354

## 2023-12-26 LAB
QT INTERVAL: 374 MS
QTC INTERVAL: 477 MS

## 2023-12-29 ENCOUNTER — APPOINTMENT (OUTPATIENT)
Dept: CT IMAGING | Facility: HOSPITAL | Age: 43
End: 2023-12-29

## 2023-12-29 ENCOUNTER — APPOINTMENT (OUTPATIENT)
Dept: GENERAL RADIOLOGY | Facility: HOSPITAL | Age: 43
End: 2023-12-29

## 2023-12-29 ENCOUNTER — HOSPITAL ENCOUNTER (EMERGENCY)
Facility: HOSPITAL | Age: 43
Discharge: HOME | End: 2023-12-29
Attending: STUDENT IN AN ORGANIZED HEALTH CARE EDUCATION/TRAINING PROGRAM

## 2023-12-29 VITALS
HEART RATE: 97 BPM | BODY MASS INDEX: 39.99 KG/M2 | OXYGEN SATURATION: 96 % | SYSTOLIC BLOOD PRESSURE: 144 MMHG | TEMPERATURE: 98 F | RESPIRATION RATE: 20 BRPM | WEIGHT: 270 LBS | DIASTOLIC BLOOD PRESSURE: 87 MMHG | HEIGHT: 69 IN

## 2023-12-29 DIAGNOSIS — R07.81 PLEURITIC CHEST PAIN: ICD-10-CM

## 2023-12-29 DIAGNOSIS — J90 RECURRENT LEFT PLEURAL EFFUSION: ICD-10-CM

## 2023-12-29 DIAGNOSIS — J18.9 COMMUNITY ACQUIRED PNEUMONIA OF LEFT LOWER LOBE OF LUNG: Primary | ICD-10-CM

## 2023-12-29 LAB
ALBUMIN SERPL-MCNC: 3.6 G/DL (ref 3.5–5.2)
ALBUMIN/GLOB SERPL: 1.2 G/DL
ALP SERPL-CCNC: 77 U/L (ref 39–117)
ALT SERPL W P-5'-P-CCNC: 15 U/L (ref 1–41)
ANION GAP SERPL CALCULATED.3IONS-SCNC: 13.7 MMOL/L (ref 5–15)
AST SERPL-CCNC: 15 U/L (ref 1–40)
BASOPHILS # BLD AUTO: 0.06 10*3/MM3 (ref 0–0.2)
BASOPHILS NFR BLD AUTO: 0.4 % (ref 0–1.5)
BILIRUB SERPL-MCNC: 0.3 MG/DL (ref 0–1.2)
BUN SERPL-MCNC: 15 MG/DL (ref 6–20)
BUN/CREAT SERPL: 18.5 (ref 7–25)
CALCIUM SPEC-SCNC: 9.2 MG/DL (ref 8.6–10.5)
CHLORIDE SERPL-SCNC: 105 MMOL/L (ref 98–107)
CO2 SERPL-SCNC: 23.3 MMOL/L (ref 22–29)
CREAT SERPL-MCNC: 0.81 MG/DL (ref 0.76–1.27)
CRP SERPL-MCNC: 2.37 MG/DL (ref 0–0.5)
D-LACTATE SERPL-SCNC: 0.8 MMOL/L (ref 0.5–2)
DEPRECATED RDW RBC AUTO: 45.3 FL (ref 37–54)
EGFRCR SERPLBLD CKD-EPI 2021: 111.5 ML/MIN/1.73
EOSINOPHIL # BLD AUTO: 0.14 10*3/MM3 (ref 0–0.4)
EOSINOPHIL NFR BLD AUTO: 1 % (ref 0.3–6.2)
ERYTHROCYTE [DISTWIDTH] IN BLOOD BY AUTOMATED COUNT: 13.8 % (ref 12.3–15.4)
ERYTHROCYTE [SEDIMENTATION RATE] IN BLOOD: 36 MM/HR (ref 0–15)
GLOBULIN UR ELPH-MCNC: 3 GM/DL
GLUCOSE SERPL-MCNC: 98 MG/DL (ref 65–99)
HCT VFR BLD AUTO: 43.9 % (ref 37.5–51)
HGB BLD-MCNC: 14.2 G/DL (ref 13–17.7)
HOLD SPECIMEN: NORMAL
HOLD SPECIMEN: NORMAL
IMM GRANULOCYTES # BLD AUTO: 0.07 10*3/MM3 (ref 0–0.05)
IMM GRANULOCYTES NFR BLD AUTO: 0.5 % (ref 0–0.5)
LYMPHOCYTES # BLD AUTO: 3.52 10*3/MM3 (ref 0.7–3.1)
LYMPHOCYTES NFR BLD AUTO: 25.7 % (ref 19.6–45.3)
MCH RBC QN AUTO: 29 PG (ref 26.6–33)
MCHC RBC AUTO-ENTMCNC: 32.3 G/DL (ref 31.5–35.7)
MCV RBC AUTO: 89.8 FL (ref 79–97)
MONOCYTES # BLD AUTO: 0.97 10*3/MM3 (ref 0.1–0.9)
MONOCYTES NFR BLD AUTO: 7.1 % (ref 5–12)
NEUTROPHILS NFR BLD AUTO: 65.3 % (ref 42.7–76)
NEUTROPHILS NFR BLD AUTO: 8.94 10*3/MM3 (ref 1.7–7)
NRBC BLD AUTO-RTO: 0 /100 WBC (ref 0–0.2)
NT-PROBNP SERPL-MCNC: 50.7 PG/ML (ref 0–450)
PLATELET # BLD AUTO: 333 10*3/MM3 (ref 140–450)
PMV BLD AUTO: 9.3 FL (ref 6–12)
POTASSIUM SERPL-SCNC: 3.5 MMOL/L (ref 3.5–5.2)
PROCALCITONIN SERPL-MCNC: 0.05 NG/ML (ref 0–0.25)
PROT SERPL-MCNC: 6.6 G/DL (ref 6–8.5)
RBC # BLD AUTO: 4.89 10*6/MM3 (ref 4.14–5.8)
SODIUM SERPL-SCNC: 142 MMOL/L (ref 136–145)
TROPONIN T SERPL HS-MCNC: 17 NG/L
WBC NRBC COR # BLD AUTO: 13.7 10*3/MM3 (ref 3.4–10.8)
WHOLE BLOOD HOLD COAG: NORMAL
WHOLE BLOOD HOLD SPECIMEN: NORMAL

## 2023-12-29 PROCEDURE — 86140 C-REACTIVE PROTEIN: CPT | Performed by: STUDENT IN AN ORGANIZED HEALTH CARE EDUCATION/TRAINING PROGRAM

## 2023-12-29 PROCEDURE — 80053 COMPREHEN METABOLIC PANEL: CPT | Performed by: STUDENT IN AN ORGANIZED HEALTH CARE EDUCATION/TRAINING PROGRAM

## 2023-12-29 PROCEDURE — 71045 X-RAY EXAM CHEST 1 VIEW: CPT | Performed by: RADIOLOGY

## 2023-12-29 PROCEDURE — 71260 CT THORAX DX C+: CPT

## 2023-12-29 PROCEDURE — 93005 ELECTROCARDIOGRAM TRACING: CPT | Performed by: STUDENT IN AN ORGANIZED HEALTH CARE EDUCATION/TRAINING PROGRAM

## 2023-12-29 PROCEDURE — 25010000002 HYDROMORPHONE PER 4 MG: Performed by: STUDENT IN AN ORGANIZED HEALTH CARE EDUCATION/TRAINING PROGRAM

## 2023-12-29 PROCEDURE — 83880 ASSAY OF NATRIURETIC PEPTIDE: CPT | Performed by: STUDENT IN AN ORGANIZED HEALTH CARE EDUCATION/TRAINING PROGRAM

## 2023-12-29 PROCEDURE — 84145 PROCALCITONIN (PCT): CPT | Performed by: STUDENT IN AN ORGANIZED HEALTH CARE EDUCATION/TRAINING PROGRAM

## 2023-12-29 PROCEDURE — 71045 X-RAY EXAM CHEST 1 VIEW: CPT

## 2023-12-29 PROCEDURE — 85652 RBC SED RATE AUTOMATED: CPT | Performed by: STUDENT IN AN ORGANIZED HEALTH CARE EDUCATION/TRAINING PROGRAM

## 2023-12-29 PROCEDURE — 85025 COMPLETE CBC W/AUTO DIFF WBC: CPT | Performed by: STUDENT IN AN ORGANIZED HEALTH CARE EDUCATION/TRAINING PROGRAM

## 2023-12-29 PROCEDURE — 25510000001 IOPAMIDOL 61 % SOLUTION: Performed by: STUDENT IN AN ORGANIZED HEALTH CARE EDUCATION/TRAINING PROGRAM

## 2023-12-29 PROCEDURE — 99285 EMERGENCY DEPT VISIT HI MDM: CPT

## 2023-12-29 PROCEDURE — 96374 THER/PROPH/DIAG INJ IV PUSH: CPT

## 2023-12-29 PROCEDURE — 96375 TX/PRO/DX INJ NEW DRUG ADDON: CPT

## 2023-12-29 PROCEDURE — 25010000002 METHYLPREDNISOLONE PER 125 MG: Performed by: STUDENT IN AN ORGANIZED HEALTH CARE EDUCATION/TRAINING PROGRAM

## 2023-12-29 PROCEDURE — 84484 ASSAY OF TROPONIN QUANT: CPT | Performed by: STUDENT IN AN ORGANIZED HEALTH CARE EDUCATION/TRAINING PROGRAM

## 2023-12-29 PROCEDURE — 83605 ASSAY OF LACTIC ACID: CPT | Performed by: STUDENT IN AN ORGANIZED HEALTH CARE EDUCATION/TRAINING PROGRAM

## 2023-12-29 RX ORDER — LEVOFLOXACIN 500 MG/1
500 TABLET, FILM COATED ORAL DAILY
Qty: 7 TABLET | Refills: 0 | Status: SHIPPED | OUTPATIENT
Start: 2023-12-29 | End: 2024-01-05

## 2023-12-29 RX ORDER — HYDROCODONE POLISTIREX AND CHLORPHENIRAMINE POLISTIREX 10; 8 MG/5ML; MG/5ML
5 SUSPENSION, EXTENDED RELEASE ORAL EVERY 12 HOURS PRN
Qty: 40 ML | Refills: 0 | Status: SHIPPED | OUTPATIENT
Start: 2023-12-29

## 2023-12-29 RX ORDER — HYDROMORPHONE HYDROCHLORIDE 1 MG/ML
0.5 INJECTION, SOLUTION INTRAMUSCULAR; INTRAVENOUS; SUBCUTANEOUS ONCE
Status: COMPLETED | OUTPATIENT
Start: 2023-12-29 | End: 2023-12-29

## 2023-12-29 RX ORDER — SODIUM CHLORIDE 0.9 % (FLUSH) 0.9 %
10 SYRINGE (ML) INJECTION AS NEEDED
Status: DISCONTINUED | OUTPATIENT
Start: 2023-12-29 | End: 2023-12-29 | Stop reason: HOSPADM

## 2023-12-29 RX ORDER — METHYLPREDNISOLONE SODIUM SUCCINATE 125 MG/2ML
125 INJECTION, POWDER, LYOPHILIZED, FOR SOLUTION INTRAMUSCULAR; INTRAVENOUS ONCE
Status: COMPLETED | OUTPATIENT
Start: 2023-12-29 | End: 2023-12-29

## 2023-12-29 RX ORDER — BENZONATATE 100 MG/1
200 CAPSULE ORAL EVERY 8 HOURS PRN
Qty: 20 CAPSULE | Refills: 0 | Status: SHIPPED | OUTPATIENT
Start: 2023-12-29

## 2023-12-29 RX ORDER — HYDROCODONE POLISTIREX AND CHLORPHENIRAMINE POLISTIREX 10; 8 MG/5ML; MG/5ML
5 SUSPENSION, EXTENDED RELEASE ORAL ONCE
Status: COMPLETED | OUTPATIENT
Start: 2023-12-29 | End: 2023-12-29

## 2023-12-29 RX ORDER — LEVOFLOXACIN 750 MG/1
750 TABLET, FILM COATED ORAL ONCE
Status: COMPLETED | OUTPATIENT
Start: 2023-12-29 | End: 2023-12-29

## 2023-12-29 RX ADMIN — METHYLPREDNISOLONE SODIUM SUCCINATE 125 MG: 125 INJECTION, POWDER, FOR SOLUTION INTRAMUSCULAR; INTRAVENOUS at 18:41

## 2023-12-29 RX ADMIN — HYDROCODONE POLISTIREX AND CHLORPHENIRAMINE POLISTIREX 5 ML: 10; 8 SUSPENSION, EXTENDED RELEASE ORAL at 20:51

## 2023-12-29 RX ADMIN — IOPAMIDOL 87 ML: 612 INJECTION, SOLUTION INTRAVENOUS at 18:44

## 2023-12-29 RX ADMIN — HYDROMORPHONE HYDROCHLORIDE 0.5 MG: 1 INJECTION, SOLUTION INTRAMUSCULAR; INTRAVENOUS; SUBCUTANEOUS at 20:40

## 2023-12-29 RX ADMIN — LEVOFLOXACIN 750 MG: 750 TABLET, FILM COATED ORAL at 20:39

## 2023-12-29 NOTE — ED PROVIDER NOTES
Subjective   History of Present Illness  43-year-old female presents with complaints of cough and shortness of breath.  Review medical records show the patient was recently seen on 12/25 and diagnosed with left lower lobe pneumonia and treated appropriately by ER provider.      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]        Past Surgical History:   Procedure Laterality Date    CYST REMOVAL         Family History   Problem Relation Age of Onset    Arthritis Mother     Diabetes Father     Heart disease Father     Hypertension Father     Arthritis Father     No Known Problems Brother        Social History     Socioeconomic History    Marital status:    Tobacco Use    Smoking status: Every Day     Packs/day: 2     Types: Cigarettes    Smokeless tobacco: Current     Types: Chew   Substance and Sexual Activity    Alcohol use: No    Drug use: No    Sexual activity: Defer           Objective   Physical Exam  Vitals and nursing note reviewed.   Constitutional:       General: He is not in acute distress.     Appearance: He is well-developed. He is obese. He is not diaphoretic.      Comments: Patient does not appear acutely ill.  Sitting on the side of the bed in no acute distress.   HENT:      Head: Normocephalic and atraumatic.      Right Ear: External ear normal.      Left Ear: External ear normal.      Nose: Nose normal.   Eyes:      Conjunctiva/sclera: Conjunctivae normal.      Pupils: Pupils are equal, round, and reactive to light.   Neck:      Vascular: No JVD.      Trachea: No tracheal deviation.   Cardiovascular:      Rate and Rhythm: Normal rate and regular rhythm.      Heart sounds: Normal heart sounds. No murmur heard.  Pulmonary:      Effort: Pulmonary effort  is normal. No respiratory distress.      Breath sounds: Wheezing present.      Comments: Diminished breath sounds in the left middle and lower lung fields.  Good aeration throughout the right lung field.    With inspiration patient begins to have a dry cough  Abdominal:      General: Bowel sounds are normal.      Palpations: Abdomen is soft.      Tenderness: There is no abdominal tenderness.   Musculoskeletal:         General: No deformity. Normal range of motion.      Cervical back: Normal range of motion and neck supple.   Skin:     General: Skin is warm and dry.      Coloration: Skin is not pale.      Findings: No erythema or rash.   Neurological:      General: No focal deficit present.      Mental Status: He is alert and oriented to person, place, and time.      Cranial Nerves: No cranial nerve deficit.   Psychiatric:         Thought Content: Thought content normal.         Procedures       Results for orders placed or performed during the hospital encounter of 12/29/23   Comprehensive Metabolic Panel    Specimen: Blood   Result Value Ref Range    Glucose 98 65 - 99 mg/dL    BUN 15 6 - 20 mg/dL    Creatinine 0.81 0.76 - 1.27 mg/dL    Sodium 142 136 - 145 mmol/L    Potassium 3.5 3.5 - 5.2 mmol/L    Chloride 105 98 - 107 mmol/L    CO2 23.3 22.0 - 29.0 mmol/L    Calcium 9.2 8.6 - 10.5 mg/dL    Total Protein 6.6 6.0 - 8.5 g/dL    Albumin 3.6 3.5 - 5.2 g/dL    ALT (SGPT) 15 1 - 41 U/L    AST (SGOT) 15 1 - 40 U/L    Alkaline Phosphatase 77 39 - 117 U/L    Total Bilirubin 0.3 0.0 - 1.2 mg/dL    Globulin 3.0 gm/dL    A/G Ratio 1.2 g/dL    BUN/Creatinine Ratio 18.5 7.0 - 25.0    Anion Gap 13.7 5.0 - 15.0 mmol/L    eGFR 111.5 >60.0 mL/min/1.73   BNP    Specimen: Blood   Result Value Ref Range    proBNP 50.7 0.0 - 450.0 pg/mL   Single High Sensitivity Troponin T    Specimen: Blood   Result Value Ref Range    HS Troponin T 17 <22 ng/L   CBC Auto Differential    Specimen: Blood   Result Value Ref Range    WBC 13.70 (H) 3.40  - 10.80 10*3/mm3    RBC 4.89 4.14 - 5.80 10*6/mm3    Hemoglobin 14.2 13.0 - 17.7 g/dL    Hematocrit 43.9 37.5 - 51.0 %    MCV 89.8 79.0 - 97.0 fL    MCH 29.0 26.6 - 33.0 pg    MCHC 32.3 31.5 - 35.7 g/dL    RDW 13.8 12.3 - 15.4 %    RDW-SD 45.3 37.0 - 54.0 fl    MPV 9.3 6.0 - 12.0 fL    Platelets 333 140 - 450 10*3/mm3    Neutrophil % 65.3 42.7 - 76.0 %    Lymphocyte % 25.7 19.6 - 45.3 %    Monocyte % 7.1 5.0 - 12.0 %    Eosinophil % 1.0 0.3 - 6.2 %    Basophil % 0.4 0.0 - 1.5 %    Immature Grans % 0.5 0.0 - 0.5 %    Neutrophils, Absolute 8.94 (H) 1.70 - 7.00 10*3/mm3    Lymphocytes, Absolute 3.52 (H) 0.70 - 3.10 10*3/mm3    Monocytes, Absolute 0.97 (H) 0.10 - 0.90 10*3/mm3    Eosinophils, Absolute 0.14 0.00 - 0.40 10*3/mm3    Basophils, Absolute 0.06 0.00 - 0.20 10*3/mm3    Immature Grans, Absolute 0.07 (H) 0.00 - 0.05 10*3/mm3    nRBC 0.0 0.0 - 0.2 /100 WBC   C-reactive Protein    Specimen: Blood   Result Value Ref Range    C-Reactive Protein 2.37 (H) 0.00 - 0.50 mg/dL   Sedimentation Rate    Specimen: Blood   Result Value Ref Range    Sed Rate 36 (H) 0 - 15 mm/hr   Lactic Acid, Plasma    Specimen: Arm, Right; Blood   Result Value Ref Range    Lactate 0.8 0.5 - 2.0 mmol/L   Procalcitonin    Specimen: Blood   Result Value Ref Range    Procalcitonin 0.05 0.00 - 0.25 ng/mL   ECG 12 Lead ED Triage Standing Order; SOA   Result Value Ref Range    QT Interval 348 ms    QTC Interval 408 ms   Green Top (Gel)   Result Value Ref Range    Extra Tube Hold for add-ons.    Lavender Top   Result Value Ref Range    Extra Tube hold for add-on    Gold Top - SST   Result Value Ref Range    Extra Tube Hold for add-ons.    Light Blue Top   Result Value Ref Range    Extra Tube Hold for add-ons.      CT Chest With Contrast Diagnostic   Final Result       1.  CT features of left lower lobe pneumonia.       2.  Small to moderate left pleural effusion, unchanged.       3.  Fatty infiltration of the liver.           This report was finalized  on 12/29/2023 6:56 PM by Lois Seth MD.          XR Chest 1 View   Final Result   Small left pleural effusion with atelectasis/airspace disease,   unchanged.           This report was finalized on 12/29/2023 5:36 PM by Alex Pallas, DO.                  ED Course  ED Course as of 12/29/23 2035   Fri Dec 29, 2023   1745 ECG 12 Lead ED Triage Standing Order; SOA  Sinus rhythm with arrhythmia.  Generalized nonspecific ST and T wave abnormalities no acute ST elevation.  Rate 83   QTc 408  Electronically signed by Millie Alves DO, 12/29/23, 5:46 PM EST.   [LK]   2023 Patient has been hemodynamically stable for the duration of his admission with no evidence of respiratory distress he has been visibly seen sitting on the side of the bed playing with his cell phone.  Patient has been overly demanding of nursing staff as well as myself relatively condescending and irrational when attempting to explain to patient he is current medical presentation, the treatment protocols and standard of care the current medication regiment that he has received that has been appropriate thus far with recommendations that I can transition antibiotics to Levaquin and discontinue amoxicillin and doxycycline.    Patient does have evidence of pleurisy given left effusion and patient has been counseled regarding that no amount of pain medication or cough suppressant will ultimately relieve all discomfort.    Tussidex prescription will be resent to the pharmacy in addition to short course of pain medication.    Patient was also counseled regarding needing to have an extensive cardiac workup given more concern for possible underlying CHF presentation.  Patient is morbidly obese with risk factors for coronary artery disease. [LK]   2033 Discontinue taking amoxicillin and doxycycline.  You will begin taking Levaquin 1 tablet daily for 7 days.  -Tessalon Perles have been sent to the pharmacy to use every 8 hours to help suppress  cough  -Tussionex-has been refilled and sent to your pharmacy which contains hydrocodone and is a controlled narcotic schedule drug.     -You will ultimately need to follow-up with cardiology to have appropriate workup for left-sided pleural effusion. [LK]      ED Course User Index  [LK] Millie Alves DO KASPER reviewed by Millie Alves DO       Medical Decision Making  Problems Addressed:  Community acquired pneumonia of left lower lobe of lung: complicated acute illness or injury  Pleuritic chest pain: complicated acute illness or injury  Recurrent left pleural effusion: complicated acute illness or injury    Amount and/or Complexity of Data Reviewed  Labs: ordered.  Radiology: ordered.  ECG/medicine tests: ordered. Decision-making details documented in ED Course.    Risk  Prescription drug management.        Final diagnoses:   Recurrent left pleural effusion   Community acquired pneumonia of left lower lobe of lung   Pleuritic chest pain       ED Disposition  ED Disposition       ED Disposition   Discharge    Condition   Stable    Comment   --               Irvin Clark MD  [address] [place] [address]  [phone]    Schedule an appointment as soon as possible for a visit   After the holidays he needed call [facility] cardiology outpatient clinic to establish a follow-up with cardiology for workup and evaluation of your left pleural effusion.         Medication List        New Prescriptions      benzonatate 100 MG capsule  Commonly known as: Tessalon Perles  Take 2 capsules by mouth Every 8 (Eight) Hours As Needed for Cough.     Hydrocod Parveen-Chlorphe Parveen ER 10-8 MG/5ML ER suspension  Commonly known as: TUSSIONEX PENNKINETIC  Take 5 mL by mouth Every 12 (Twelve) Hours As Needed for Cough.     levoFLOXacin 500 MG tablet  Commonly known as: LEVAQUIN  Take 1 tablet by mouth Daily for 7 days. pneumonia            Stop      amoxicillin-clavulanate 875-125 MG per  tablet  Commonly known as: AUGMENTIN     doxycycline 100 MG capsule  Commonly known as: MONODOX               Where to Get Your Medications        These medications were sent to [facility] [id number] - [place] [address] - [phone]  - [phone] FX  [address] [place] [address]      Phone: [phone]   benzonatate 100 MG capsule  Hydrocod Parveen-Chlorphe Parveen ER 10-8 MG/5ML ER suspension  levoFLOXacin 500 MG tablet            Millie Alves DO  12/29/23 2032

## 2023-12-30 NOTE — DISCHARGE INSTRUCTIONS
Discontinue taking amoxicillin and doxycycline.  You will begin taking Levaquin 1 tablet daily for 7 days.  -Tessalon Perles have been sent to the pharmacy to use every 8 hours to help suppress cough  -Tussionex-has been refilled and sent to your pharmacy which contains hydrocodone and is a controlled narcotic schedule drug.     -You will ultimately need to follow-up with cardiology to have appropriate workup for left-sided pleural effusion.

## 2023-12-31 LAB
QT INTERVAL: 348 MS
QTC INTERVAL: 408 MS

## 2024-01-08 ENCOUNTER — HOSPITAL ENCOUNTER (EMERGENCY)
Facility: HOSPITAL | Age: 44
Discharge: HOME | End: 2024-01-08
Attending: STUDENT IN AN ORGANIZED HEALTH CARE EDUCATION/TRAINING PROGRAM | Admitting: STUDENT IN AN ORGANIZED HEALTH CARE EDUCATION/TRAINING PROGRAM

## 2024-01-08 ENCOUNTER — APPOINTMENT (OUTPATIENT)
Dept: CT IMAGING | Facility: HOSPITAL | Age: 44
End: 2024-01-08

## 2024-01-08 ENCOUNTER — APPOINTMENT (OUTPATIENT)
Dept: GENERAL RADIOLOGY | Facility: HOSPITAL | Age: 44
End: 2024-01-08

## 2024-01-08 VITALS
HEART RATE: 63 BPM | SYSTOLIC BLOOD PRESSURE: 149 MMHG | RESPIRATION RATE: 18 BRPM | OXYGEN SATURATION: 95 % | DIASTOLIC BLOOD PRESSURE: 100 MMHG | TEMPERATURE: 97.1 F | BODY MASS INDEX: 39.99 KG/M2 | WEIGHT: 270 LBS | HEIGHT: 69 IN

## 2024-01-08 DIAGNOSIS — J06.9 UPPER RESPIRATORY TRACT INFECTION, UNSPECIFIED TYPE: Primary | ICD-10-CM

## 2024-01-08 DIAGNOSIS — R09.1 PLEURISY: ICD-10-CM

## 2024-01-08 LAB
ALBUMIN SERPL-MCNC: 3.7 G/DL (ref 3.5–5.2)
ALBUMIN/GLOB SERPL: 1.2 G/DL
ALP SERPL-CCNC: 83 U/L (ref 39–117)
ALT SERPL W P-5'-P-CCNC: 13 U/L (ref 1–41)
ANION GAP SERPL CALCULATED.3IONS-SCNC: 9.9 MMOL/L (ref 5–15)
AST SERPL-CCNC: 13 U/L (ref 1–40)
BASOPHILS # BLD AUTO: 0.03 10*3/MM3 (ref 0–0.2)
BASOPHILS NFR BLD AUTO: 0.4 % (ref 0–1.5)
BILIRUB SERPL-MCNC: 0.3 MG/DL (ref 0–1.2)
BUN SERPL-MCNC: 8 MG/DL (ref 6–20)
BUN/CREAT SERPL: 9.8 (ref 7–25)
CALCIUM SPEC-SCNC: 9.3 MG/DL (ref 8.6–10.5)
CHLORIDE SERPL-SCNC: 105 MMOL/L (ref 98–107)
CO2 SERPL-SCNC: 24.1 MMOL/L (ref 22–29)
CREAT SERPL-MCNC: 0.82 MG/DL (ref 0.76–1.27)
DEPRECATED RDW RBC AUTO: 45.9 FL (ref 37–54)
EGFRCR SERPLBLD CKD-EPI 2021: 111.1 ML/MIN/1.73
EOSINOPHIL # BLD AUTO: 0.19 10*3/MM3 (ref 0–0.4)
EOSINOPHIL NFR BLD AUTO: 2.5 % (ref 0.3–6.2)
ERYTHROCYTE [DISTWIDTH] IN BLOOD BY AUTOMATED COUNT: 13.9 % (ref 12.3–15.4)
GLOBULIN UR ELPH-MCNC: 3.2 GM/DL
GLUCOSE SERPL-MCNC: 122 MG/DL (ref 65–99)
HCT VFR BLD AUTO: 49.3 % (ref 37.5–51)
HGB BLD-MCNC: 15.9 G/DL (ref 13–17.7)
HOLD SPECIMEN: NORMAL
HOLD SPECIMEN: NORMAL
IMM GRANULOCYTES # BLD AUTO: 0.05 10*3/MM3 (ref 0–0.05)
IMM GRANULOCYTES NFR BLD AUTO: 0.6 % (ref 0–0.5)
LYMPHOCYTES # BLD AUTO: 2.13 10*3/MM3 (ref 0.7–3.1)
LYMPHOCYTES NFR BLD AUTO: 27.6 % (ref 19.6–45.3)
MCH RBC QN AUTO: 28.9 PG (ref 26.6–33)
MCHC RBC AUTO-ENTMCNC: 32.3 G/DL (ref 31.5–35.7)
MCV RBC AUTO: 89.6 FL (ref 79–97)
MONOCYTES # BLD AUTO: 1.38 10*3/MM3 (ref 0.1–0.9)
MONOCYTES NFR BLD AUTO: 17.9 % (ref 5–12)
NEUTROPHILS NFR BLD AUTO: 3.95 10*3/MM3 (ref 1.7–7)
NEUTROPHILS NFR BLD AUTO: 51 % (ref 42.7–76)
NRBC BLD AUTO-RTO: 0 /100 WBC (ref 0–0.2)
PLATELET # BLD AUTO: 229 10*3/MM3 (ref 140–450)
PMV BLD AUTO: 9.1 FL (ref 6–12)
POTASSIUM SERPL-SCNC: 4 MMOL/L (ref 3.5–5.2)
PROT SERPL-MCNC: 6.9 G/DL (ref 6–8.5)
RBC # BLD AUTO: 5.5 10*6/MM3 (ref 4.14–5.8)
SODIUM SERPL-SCNC: 139 MMOL/L (ref 136–145)
TROPONIN T SERPL HS-MCNC: 17 NG/L
WBC NRBC COR # BLD AUTO: 7.73 10*3/MM3 (ref 3.4–10.8)
WHOLE BLOOD HOLD COAG: NORMAL
WHOLE BLOOD HOLD SPECIMEN: NORMAL

## 2024-01-08 PROCEDURE — 96374 THER/PROPH/DIAG INJ IV PUSH: CPT

## 2024-01-08 PROCEDURE — 99285 EMERGENCY DEPT VISIT HI MDM: CPT

## 2024-01-08 PROCEDURE — 71260 CT THORAX DX C+: CPT

## 2024-01-08 PROCEDURE — 71045 X-RAY EXAM CHEST 1 VIEW: CPT | Performed by: RADIOLOGY

## 2024-01-08 PROCEDURE — 93005 ELECTROCARDIOGRAM TRACING: CPT | Performed by: STUDENT IN AN ORGANIZED HEALTH CARE EDUCATION/TRAINING PROGRAM

## 2024-01-08 PROCEDURE — 71260 CT THORAX DX C+: CPT | Performed by: RADIOLOGY

## 2024-01-08 PROCEDURE — 80053 COMPREHEN METABOLIC PANEL: CPT | Performed by: STUDENT IN AN ORGANIZED HEALTH CARE EDUCATION/TRAINING PROGRAM

## 2024-01-08 PROCEDURE — 36415 COLL VENOUS BLD VENIPUNCTURE: CPT

## 2024-01-08 PROCEDURE — 25510000001 IOPAMIDOL 61 % SOLUTION: Performed by: STUDENT IN AN ORGANIZED HEALTH CARE EDUCATION/TRAINING PROGRAM

## 2024-01-08 PROCEDURE — 84484 ASSAY OF TROPONIN QUANT: CPT | Performed by: STUDENT IN AN ORGANIZED HEALTH CARE EDUCATION/TRAINING PROGRAM

## 2024-01-08 PROCEDURE — 25010000002 KETOROLAC TROMETHAMINE PER 15 MG: Performed by: STUDENT IN AN ORGANIZED HEALTH CARE EDUCATION/TRAINING PROGRAM

## 2024-01-08 PROCEDURE — 85025 COMPLETE CBC W/AUTO DIFF WBC: CPT | Performed by: STUDENT IN AN ORGANIZED HEALTH CARE EDUCATION/TRAINING PROGRAM

## 2024-01-08 PROCEDURE — 71045 X-RAY EXAM CHEST 1 VIEW: CPT

## 2024-01-08 RX ORDER — PROMETHAZINE HYDROCHLORIDE AND CODEINE PHOSPHATE 6.25; 1 MG/5ML; MG/5ML
5 SYRUP ORAL
Qty: 35 ML | Refills: 0 | Status: SHIPPED | OUTPATIENT
Start: 2024-01-08

## 2024-01-08 RX ORDER — KETOROLAC TROMETHAMINE 30 MG/ML
30 INJECTION, SOLUTION INTRAMUSCULAR; INTRAVENOUS ONCE
Status: DISCONTINUED | OUTPATIENT
Start: 2024-01-08 | End: 2024-01-08

## 2024-01-08 RX ORDER — KETOROLAC TROMETHAMINE 30 MG/ML
30 INJECTION, SOLUTION INTRAMUSCULAR; INTRAVENOUS ONCE
Status: COMPLETED | OUTPATIENT
Start: 2024-01-08 | End: 2024-01-08

## 2024-01-08 RX ORDER — SODIUM CHLORIDE 0.9 % (FLUSH) 0.9 %
10 SYRINGE (ML) INJECTION AS NEEDED
Status: DISCONTINUED | OUTPATIENT
Start: 2024-01-08 | End: 2024-01-08 | Stop reason: HOSPADM

## 2024-01-08 RX ORDER — ASPIRIN 81 MG/1
324 TABLET, CHEWABLE ORAL ONCE
Status: COMPLETED | OUTPATIENT
Start: 2024-01-08 | End: 2024-01-08

## 2024-01-08 RX ORDER — KETOROLAC TROMETHAMINE 10 MG/1
10 TABLET, FILM COATED ORAL EVERY 6 HOURS PRN
Qty: 20 TABLET | Refills: 0 | Status: SHIPPED | OUTPATIENT
Start: 2024-01-08

## 2024-01-08 RX ADMIN — ASPIRIN 324 MG: 81 TABLET, CHEWABLE ORAL at 14:38

## 2024-01-08 RX ADMIN — IOPAMIDOL 100 ML: 612 INJECTION, SOLUTION INTRAVENOUS at 16:03

## 2024-01-08 RX ADMIN — KETOROLAC TROMETHAMINE 30 MG: 30 INJECTION, SOLUTION INTRAMUSCULAR; INTRAVENOUS at 15:57

## 2024-01-08 NOTE — ED PROVIDER NOTES
Subjective   History of Present Illness    43-year-old male with history of recent pneumonia presenting for increased congestion, cough and sharp left lower chest pain over the last few days.  States that pain worsened with increase in cough.  Pain is worse with coughing.  Reports dull ache across the diaphragmatic area diffusely.  No fever.  No nausea, vomiting, diarrhea.  No leg swelling.  Pain is consistent with his pleurisy.    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]        Past Surgical History:   Procedure Laterality Date    CYST REMOVAL         Family History   Problem Relation Age of Onset    Arthritis Mother     Diabetes Father     Heart disease Father     Hypertension Father     Arthritis Father     No Known Problems Brother        Social History     Socioeconomic History    Marital status:    Tobacco Use    Smoking status: Every Day     Packs/day: 2     Types: Cigarettes    Smokeless tobacco: Current     Types: Chew   Substance and Sexual Activity    Alcohol use: No    Drug use: No    Sexual activity: Defer           Objective   Physical Exam  Vitals and nursing note reviewed.   Constitutional:       General: He is not in acute distress.  HENT:      Head: Normocephalic.      Comments: Congested     Mouth/Throat:      Mouth: Mucous membranes are moist.   Cardiovascular:      Rate and Rhythm: Normal rate and regular rhythm.      Pulses: Normal pulses.   Pulmonary:      Effort: Pulmonary effort is normal. No respiratory distress.      Breath sounds: Normal breath sounds.   Abdominal:      General: Abdomen is flat. There is no distension.      Palpations: Abdomen is soft.   Musculoskeletal:         General: Normal range of motion.      Cervical back:  Normal range of motion and neck supple.   Skin:     General: Skin is warm and dry.      Capillary Refill: Capillary refill takes less than 2 seconds.   Neurological:      Mental Status: He is alert.         Procedures           ED Course  ED Course as of 01/09/24 0248   Mon Jan 08, 2024   1410 EKG notes sinus rhythm.  98 bpm.  I directly evaluated the EKG of this patient and noted no acute abnormalities.  Electronically signed by Sukhjinder Ziegler DO, 01/08/24, 2:11 PM EST.   [SF]      ED Course User Index  [SF] Sukhjinder Ziegler DO                                     AJ reviewed by Zaira Nichole MD       Medical Decision Making  Problems Addressed:  Pleurisy: complicated acute illness or injury  Upper respiratory tract infection, unspecified type: complicated acute illness or injury    Amount and/or Complexity of Data Reviewed  Labs: ordered.  Radiology: ordered.  ECG/medicine tests: ordered.    Risk  OTC drugs.  Prescription drug management.      Chest x-ray obtained which showed improvement in pneumonia but continued pleural effusion.  CT ordered to evaluate lung parenchyma better and particularly effusion to evaluate for signs of empyema.  Based on imaging and normal white blood cell count as well as patient does not appear toxic at this time I have lower suspicion for empyema.  Suspect patient has likely developed a upper respiratory infection that is worsened his cough and therefore worsened his other symptoms including pleurisy.  Supportive care measures reviewed with the patient.  Toradol given for pain control.  Patient states he has tried Mobic in the past and this is not been a effective for him.  Will prescribe oral diclofenac.  NSAIDs precautions reviewed.    Final diagnoses:   Upper respiratory tract infection, unspecified type   Pleurisy       ED Disposition  ED Disposition       ED Disposition   Discharge    Condition   Stable    Comment   --               Elida Lyle, APRN  [id number]  [place] [address]  [phone]               Medication List        New Prescriptions      ketorolac 10 MG tablet  Commonly known as: TORADOL  Take 1 tablet by mouth Every 6 (Six) Hours As Needed for Severe Pain.     promethazine-codeine 6.25-10 MG/5ML syrup  Commonly known as: PHENERGAN with CODEINE  Take 5 mL by mouth every night at bedtime.               Where to Get Your Medications        These medications were sent to [facility] [id number] - [place], [place] - [address] AT [address] [place] [address] - [phone]  - [phone] FX  [address] [facility] [place] [address]      Phone: [phone]   ketorolac 10 MG tablet  promethazine-codeine 6.25-10 MG/5ML syrup            Zaira Nichole MD  01/09/24 5139

## 2024-01-08 NOTE — DISCHARGE INSTRUCTIONS
Use Flonase 2 sprays twice a day in each nostril to help open up the small passages in your nose.    Take plain Mucinex (guaifenesin) twice a day with a large glass of water to help thin the mucus.    Recommend that you do the sinus rinses at least twice a day but you can do this as often as needed throughout the day to help with the congestion and cough.    Only use the cough syrup at night before bed.    You can take the Toradol (ketorolac) as needed for pain.  Do not take it with any other NSAIDs such as ibuprofen, Motrin, Aleve, naproxen.  Also recommend that you take it with food to help prevent upset stomach.

## 2024-01-09 LAB
QT INTERVAL: 348 MS
QTC INTERVAL: 444 MS

## 2024-09-16 ENCOUNTER — TELEMEDICINE (OUTPATIENT)
Dept: FAMILY MEDICINE CLINIC | Facility: TELEHEALTH | Age: 44
End: 2024-09-16

## 2024-09-16 DIAGNOSIS — J01.00 ACUTE NON-RECURRENT MAXILLARY SINUSITIS: Primary | ICD-10-CM

## 2024-09-16 RX ORDER — FLUTICASONE PROPIONATE 50 MCG
2 SPRAY, SUSPENSION (ML) NASAL DAILY
Qty: 18.2 G | Refills: 0 | Status: SHIPPED | OUTPATIENT
Start: 2024-09-16

## 2024-09-16 RX ORDER — DEXTROMETHORPHAN HYDROBROMIDE AND PROMETHAZINE HYDROCHLORIDE 15; 6.25 MG/5ML; MG/5ML
5 SYRUP ORAL NIGHTLY PRN
Qty: 100 ML | Refills: 0 | Status: SHIPPED | OUTPATIENT
Start: 2024-09-16

## 2024-09-16 RX ORDER — PREDNISONE 10 MG/1
TABLET ORAL
Qty: 30 TABLET | Refills: 0 | Status: SHIPPED | OUTPATIENT
Start: 2024-09-16

## 2024-12-06 RX ORDER — LIDOCAINE HYDROCHLORIDE 20 MG/ML
10 SOLUTION OROPHARYNGEAL AS NEEDED
Qty: 100 ML | Refills: 0 | Status: SHIPPED | OUTPATIENT
Start: 2024-12-06 | End: 2024-12-16